# Patient Record
Sex: FEMALE | Race: WHITE | NOT HISPANIC OR LATINO
[De-identification: names, ages, dates, MRNs, and addresses within clinical notes are randomized per-mention and may not be internally consistent; named-entity substitution may affect disease eponyms.]

---

## 2018-01-31 DIAGNOSIS — Z82.61 FAMILY HISTORY OF ARTHRITIS: ICD-10-CM

## 2018-01-31 RX ORDER — CHROMIUM 200 MCG
1000 TABLET ORAL DAILY
Qty: 90 | Refills: 3 | Status: ACTIVE | COMMUNITY
Start: 2018-01-31

## 2018-02-21 ENCOUNTER — APPOINTMENT (OUTPATIENT)
Dept: INTERNAL MEDICINE | Facility: CLINIC | Age: 63
End: 2018-02-21
Payer: COMMERCIAL

## 2018-02-21 ENCOUNTER — NON-APPOINTMENT (OUTPATIENT)
Age: 63
End: 2018-02-21

## 2018-02-21 VITALS — HEIGHT: 64 IN | SYSTOLIC BLOOD PRESSURE: 126 MMHG | TEMPERATURE: 98.2 F | DIASTOLIC BLOOD PRESSURE: 88 MMHG

## 2018-02-21 VITALS — DIASTOLIC BLOOD PRESSURE: 82 MMHG | SYSTOLIC BLOOD PRESSURE: 130 MMHG

## 2018-02-21 DIAGNOSIS — Z82.3 FAMILY HISTORY OF STROKE: ICD-10-CM

## 2018-02-21 DIAGNOSIS — L25.9 UNSPECIFIED CONTACT DERMATITIS, UNSPECIFIED CAUSE: ICD-10-CM

## 2018-02-21 DIAGNOSIS — Z87.19 PERSONAL HISTORY OF OTHER DISEASES OF THE DIGESTIVE SYSTEM: ICD-10-CM

## 2018-02-21 DIAGNOSIS — Z80.2 FAMILY HISTORY OF MALIGNANT NEOPLASM OF OTHER RESPIRATORY AND INTRATHORACIC ORGANS: ICD-10-CM

## 2018-02-21 LAB
BILIRUB UR QL STRIP: NEGATIVE
CLARITY UR: CLEAR
COLLECTION METHOD: NORMAL
GLUCOSE UR-MCNC: NEGATIVE
HCG UR QL: 0.2 EU/DL
HGB UR QL STRIP.AUTO: NEGATIVE
KETONES UR-MCNC: NEGATIVE
LEUKOCYTE ESTERASE UR QL STRIP: NEGATIVE
NITRITE UR QL STRIP: NEGATIVE
PH UR STRIP: 5
PROT UR STRIP-MCNC: NEGATIVE
SP GR UR STRIP: 1.02

## 2018-02-21 PROCEDURE — 82270 OCCULT BLOOD FECES: CPT

## 2018-02-21 PROCEDURE — 99396 PREV VISIT EST AGE 40-64: CPT | Mod: 25

## 2018-02-21 PROCEDURE — 81003 URINALYSIS AUTO W/O SCOPE: CPT | Mod: QW

## 2018-02-21 PROCEDURE — 93000 ELECTROCARDIOGRAM COMPLETE: CPT

## 2018-02-21 RX ORDER — ESOMEPRAZOLE MAGNESIUM 40 MG/1
40 CAPSULE, DELAYED RELEASE ORAL
Refills: 0 | Status: DISCONTINUED | COMMUNITY
Start: 2018-01-31 | End: 2018-02-21

## 2018-02-21 RX ORDER — TRAMADOL HYDROCHLORIDE 100 MG/1
100 CAPSULE ORAL TWICE DAILY
Refills: 0 | Status: ACTIVE | COMMUNITY
Start: 2018-02-21

## 2018-02-21 RX ORDER — TRAMADOL HYDROCHLORIDE 50 MG/1
50 TABLET, COATED ORAL
Refills: 0 | Status: DISCONTINUED | COMMUNITY
Start: 2018-01-31 | End: 2018-02-21

## 2018-02-21 RX ORDER — BIOTIN 1000 MCG
1000 TABLET,CHEWABLE ORAL DAILY
Refills: 0 | Status: ACTIVE | COMMUNITY
Start: 2018-02-21

## 2018-02-21 RX ORDER — INDOMETHACIN 50 MG/1
50 CAPSULE ORAL
Qty: 30 | Refills: 0 | Status: DISCONTINUED | COMMUNITY
Start: 2018-01-31 | End: 2018-02-21

## 2018-02-21 RX ORDER — FEXOFENADINE HYDROCHLORIDE 180 MG/1
180 TABLET, FILM COATED ORAL
Refills: 0 | Status: ACTIVE | COMMUNITY
Start: 2018-02-21

## 2019-04-17 ENCOUNTER — NON-APPOINTMENT (OUTPATIENT)
Age: 64
End: 2019-04-17

## 2019-04-17 ENCOUNTER — APPOINTMENT (OUTPATIENT)
Dept: INTERNAL MEDICINE | Facility: CLINIC | Age: 64
End: 2019-04-17
Payer: COMMERCIAL

## 2019-04-17 VITALS
TEMPERATURE: 98.5 F | OXYGEN SATURATION: 96 % | SYSTOLIC BLOOD PRESSURE: 124 MMHG | DIASTOLIC BLOOD PRESSURE: 90 MMHG | HEIGHT: 64 IN | HEART RATE: 70 BPM

## 2019-04-17 VITALS — DIASTOLIC BLOOD PRESSURE: 74 MMHG | SYSTOLIC BLOOD PRESSURE: 122 MMHG

## 2019-04-17 DIAGNOSIS — M19.90 UNSPECIFIED OSTEOARTHRITIS, UNSPECIFIED SITE: ICD-10-CM

## 2019-04-17 DIAGNOSIS — R60.0 LOCALIZED EDEMA: ICD-10-CM

## 2019-04-17 PROCEDURE — 93000 ELECTROCARDIOGRAM COMPLETE: CPT

## 2019-04-17 PROCEDURE — 82270 OCCULT BLOOD FECES: CPT

## 2019-04-17 PROCEDURE — 99396 PREV VISIT EST AGE 40-64: CPT | Mod: 25

## 2019-04-17 RX ORDER — ACETAMINOPHEN 325 MG/1
325 TABLET, FILM COATED ORAL EVERY 6 HOURS
Refills: 0 | Status: COMPLETED | COMMUNITY
Start: 2018-02-21 | End: 2019-04-17

## 2019-04-17 NOTE — PAST MEDICAL HISTORY
[Menarche Age ____] : age at menarche was [unfilled] [Postmenopausal] : postmenopausal [Menopause Age____] : age at menopause was [unfilled] [Total Preg ___] : G[unfilled] [Live Births ___] : P[unfilled]  [AB Spont ___] : miscarriages: [unfilled]

## 2019-04-17 NOTE — DATA REVIEWED
[FreeTextEntry1] : Derm - never\par \par EKG - SB, R - 59, axis - (-)30, LVH, PRWP, no interval change. \par \par Labs 4/12/2019 reviewed -\par * Glucose - 78, HbA1c - 5.7,\par * chol - 175, HDL - 43, LDL - 113, TG - 95,\par * RF - 19(H), ESR - 19(N), CRP - 0.5(N),\par * the rest of labs were unremarkable.

## 2019-04-17 NOTE — PHYSICAL EXAM
[No Acute Distress] : no acute distress [Well Nourished] : well nourished [Well Developed] : well developed [Well-Appearing] : well-appearing [Normal Sclera/Conjunctiva] : normal sclera/conjunctiva [EOMI] : extraocular movements intact [PERRL] : pupils equal round and reactive to light [Normal Oropharynx] : the oropharynx was normal [Normal TMs] : both tympanic membranes were normal [Normal Nasal Mucosa] : the nasal mucosa was normal [Supple] : supple [No JVD] : no jugular venous distention [No Lymphadenopathy] : no lymphadenopathy [No Respiratory Distress] : no respiratory distress  [Clear to Auscultation] : lungs were clear to auscultation bilaterally [Regular Rhythm] : with a regular rhythm [Normal Rate] : normal rate  [Normal S1, S2] : normal S1 and S2 [No Carotid Bruits] : no carotid bruits [Pedal Pulses Present] : the pedal pulses are present [No Extremity Clubbing/Cyanosis] : no extremity clubbing/cyanosis [No Edema] : there was no peripheral edema [Normal Appearance] : normal in appearance [No Masses] : no palpable masses [No Nipple Discharge] : no nipple discharge [No Axillary Lymphadenopathy] : no axillary lymphadenopathy [Soft] : abdomen soft [Non Tender] : non-tender [Non-distended] : non-distended [Normal Bowel Sounds] : normal bowel sounds [Normal Sphincter Tone] : normal sphincter tone [No Mass] : no mass [Stool Occult Blood] : stool negative for occult blood [Normal Supraclavicular Nodes] : no supraclavicular lymphadenopathy [Normal Axillary Nodes] : no axillary lymphadenopathy [Normal Posterior Cervical Nodes] : no posterior cervical lymphadenopathy [Normal Anterior Cervical Nodes] : no anterior cervical lymphadenopathy [No Spinal Tenderness] : no spinal tenderness [No CVA Tenderness] : no CVA  tenderness [No Joint Swelling] : no joint swelling [Grossly Normal Strength/Tone] : grossly normal strength/tone [No Rash] : no rash [Normal Gait] : normal gait [No Focal Deficits] : no focal deficits [Coordination Grossly Intact] : coordination grossly intact [Speech Grossly Normal] : speech grossly normal [Normal Affect] : the affect was normal [Alert and Oriented x3] : oriented to person, place, and time [Normal Mood] : the mood was normal [de-identified] : Obese female in stated age,

## 2019-04-17 NOTE — HISTORY OF PRESENT ILLNESS
[de-identified] : Pt presented for PE.  Last PE was 2/2018.\par \par Pt c/o feeling very tired and sweaty after she swims.  Her glucose was in the 60's during these episodes, if she eats after she swims, then it helps.  She also has to sleep or at least lie down for 10-15 minutes after she swims.\par \par She thinks she lost about 8 pounds on [employer] in the past month.\par \par She urinates a lot in the last few months.  \par \par Pt got Flu vaccine in the falls from her  who's an MD.\par \par She is followed by rheumatologist for RA, and she was told she also has OA and possibly psoriatic arthritis as well as Sjogrens syndrome.  She is on Plaquenil and her symptoms are stable.

## 2019-04-17 NOTE — REVIEW OF SYSTEMS
[Recent Change In Weight] : ~T recent weight change [Frequency] : frequency [Joint Stiffness] : joint stiffness [Joint Pain] : joint pain [Joint Swelling] : joint swelling [Negative] : Heme/Lymph [Fatigue] : no fatigue [Fever] : no fever [Chills] : no chills [Chest Pain] : no chest pain [Palpitations] : no palpitations [Lower Ext Edema] : no lower extremity edema [Shortness Of Breath] : no shortness of breath [Wheezing] : no wheezing [Dyspnea on Exertion] : no dyspnea on exertion [Cough] : no cough [Nausea] : no nausea [Abdominal Pain] : no abdominal pain [Constipation] : no constipation [Diarrhea] : diarrhea [Vomiting] : no vomiting [Melena] : no melena [Heartburn] : no heartburn [Dysuria] : no dysuria [Incontinence] : no incontinence [Nocturia] : no nocturia [Hematuria] : no hematuria [Itching] : no itching [Back Pain] : no back pain [Skin Rash] : no skin rash [Mole Changes] : no mole changes [Headache] : no headache [Dizziness] : no dizziness [Fainting] : no fainting [Unsteady Walking] : no ataxia [Anxiety] : no anxiety [Insomnia] : no insomnia [Easy Bleeding] : no easy bleeding [Depression] : no depression [Swollen Glands] : no swollen glands [Easy Bruising] : no easy bruising [FreeTextEntry8] : As in HPI, Nocturia of 1 X per night,  [FreeTextEntry2] : As in HPI, [FreeTextEntry3] : wears OTC reading glasses, [FreeTextEntry9] : No change of her symptoms,

## 2019-04-17 NOTE — HEALTH RISK ASSESSMENT
[Good] : ~his/her~ current health as good [Very Good] : ~his/her~  mood as very good [No falls in past year] : Patient reported no falls in the past year [0] : 2) Feeling down, depressed, or hopeless: Not at all (0) [No Retinopathy] : No retinopathy [HIV Test offered] : HIV Test offered [Hepatitis C test offered] : Hepatitis C test offered [None] : None [With Family] : lives with family [# of Members in Household ___] :  household currently consist of [unfilled] member(s) [Employed] : employed [Graduate School] : graduate school [] :  [# Of Children ___] : has [unfilled] children [Feels Safe at Home] : Feels safe at home [Fully functional (bathing, dressing, toileting, transferring, walking, feeding)] : Fully functional (bathing, dressing, toileting, transferring, walking, feeding) [Fully functional (using the telephone, shopping, preparing meals, housekeeping, doing laundry, using] : Fully functional and needs no help or supervision to perform IADLs (using the telephone, shopping, preparing meals, housekeeping, doing laundry, using transportation, managing medications and managing finances) [Smoke Detector] : smoke detector [Carbon Monoxide Detector] : carbon monoxide detector [Seat Belt] :  uses seat belt [With Patient/Caregiver] : With Patient/Caregiver [Designated Healthcare Proxy] : Designated healthcare proxy [Relationship: ___] : Relationship: [unfilled] [de-identified] : swims 6 days per week, also very active through the days. [EyeExamDate] : 4/2019 [] : No [Change in mental status noted] : No change in mental status noted [Reports changes in hearing] : Reports no changes in hearing [Reports changes in dental health] : Reports no changes in dental health [Reports changes in vision] : Reports no changes in vision [PapSmearDate] : 3/2018 [MammogramDate] : 3/2018 [ColonoscopyDate] : 3/2017 [BoneDensityDate] : >5 years [ColonoscopyComments] : EGD - 3/2017 [de-identified] : dentist - 8/2018 [AdvancecareDate] : 4/2019

## 2019-04-17 NOTE — ASSESSMENT
[FreeTextEntry1] : Pt is UTD with colonoscopy.  She is due for repeat Pap smear, mammogram and DEXA scan.  She will f/u with GYN for them.  She was reminded to have routine eye exam, dental care and skin exam with dermatologist.

## 2020-07-08 ENCOUNTER — APPOINTMENT (OUTPATIENT)
Dept: INTERNAL MEDICINE | Facility: CLINIC | Age: 65
End: 2020-07-08
Payer: COMMERCIAL

## 2020-07-08 VITALS — DIASTOLIC BLOOD PRESSURE: 86 MMHG | SYSTOLIC BLOOD PRESSURE: 120 MMHG

## 2020-07-08 VITALS
DIASTOLIC BLOOD PRESSURE: 80 MMHG | OXYGEN SATURATION: 97 % | HEIGHT: 64 IN | HEART RATE: 69 BPM | SYSTOLIC BLOOD PRESSURE: 130 MMHG | TEMPERATURE: 98.2 F

## 2020-07-08 DIAGNOSIS — U07.1 COVID-19: ICD-10-CM

## 2020-07-08 PROCEDURE — 99213 OFFICE O/P EST LOW 20 MIN: CPT

## 2020-07-08 NOTE — ASSESSMENT
[FreeTextEntry1] : Patient is late for her physical exam.  I gave her prescription to have routine labs done and to have her schedule appointment for physical exam.

## 2020-07-08 NOTE — PHYSICAL EXAM
[No Acute Distress] : no acute distress [Well Nourished] : well nourished [No JVD] : no jugular venous distention [Well Developed] : well developed [Supple] : supple [No Respiratory Distress] : no respiratory distress  [Clear to Auscultation] : lungs were clear to auscultation bilaterally [Normal Rate] : normal rate  [Regular Rhythm] : with a regular rhythm [Normal S1, S2] : normal S1 and S2 [Normal Appearance] : normal in appearance [No Edema] : there was no peripheral edema [No Nipple Discharge] : no nipple discharge [No Axillary Lymphadenopathy] : no axillary lymphadenopathy [Soft] : abdomen soft [Non Tender] : non-tender [Normal Bowel Sounds] : normal bowel sounds [Normal Supraclavicular Nodes] : no supraclavicular lymphadenopathy [Normal Axillary Nodes] : no axillary lymphadenopathy [Normal Posterior Cervical Nodes] : no posterior cervical lymphadenopathy [Normal Anterior Cervical Nodes] : no anterior cervical lymphadenopathy [No Joint Swelling] : no joint swelling [Speech Grossly Normal] : speech grossly normal [Normal Affect] : the affect was normal [Alert and Oriented x3] : oriented to person, place, and time [Normal Mood] : the mood was normal [de-identified] : Obese female in stated age,  [de-identified] : There was a small (marble size) nontender nodule on the R breast at 4 o'clock,

## 2020-07-08 NOTE — HISTORY OF PRESENT ILLNESS
[FreeTextEntry8] : Pt presented with lump at the base of her R breast for a few weeks, it's a little sore, but no change.  She also noted a lump on the L elbow for about 2 weeks that's not painful.  Last mammogram in 3/2020 was unremarkable.\par \par Pt had COVID infection in 3/2020.  Her illness was mild and recovered after a few days.  Antibody test done in 5/2020 was positive.

## 2020-11-13 ENCOUNTER — APPOINTMENT (OUTPATIENT)
Dept: GASTROENTEROLOGY | Facility: CLINIC | Age: 65
End: 2020-11-13
Payer: COMMERCIAL

## 2020-11-13 VITALS
HEIGHT: 64 IN | OXYGEN SATURATION: 98 % | TEMPERATURE: 96.9 F | DIASTOLIC BLOOD PRESSURE: 80 MMHG | HEART RATE: 74 BPM | SYSTOLIC BLOOD PRESSURE: 130 MMHG | BODY MASS INDEX: 39.27 KG/M2 | WEIGHT: 230 LBS

## 2020-11-13 DIAGNOSIS — L40.9 PSORIASIS, UNSPECIFIED: ICD-10-CM

## 2020-11-13 PROCEDURE — 99072 ADDL SUPL MATRL&STAF TM PHE: CPT

## 2020-11-13 PROCEDURE — 99203 OFFICE O/P NEW LOW 30 MIN: CPT

## 2020-11-13 RX ORDER — IBUPROFEN 200 MG/1
200 TABLET ORAL
Refills: 0 | Status: DISCONTINUED | COMMUNITY
Start: 2018-02-21 | End: 2020-11-13

## 2020-11-15 NOTE — HISTORY OF PRESENT ILLNESS
[FreeTextEntry1] : The patient is a 65-year-old woman with a history of adenomatous colonic polyps and a strong family history of colon cancer in both her father and brother.  She also has reflux and has been having intermittent heartburn.  She was suffering from odynophagia but this seems to have resolved.  She denies abdominal pain.  She has 1 solid bowel movement a day without melena or bright red blood per rectum.  The patient's weight is stable.  The patient has rheumatoid arthritis and has been taking both indomethacin and Aleve.  She had COVID-19 in the spring with mild symptoms which have resolved.  The patient has not been admitted to the hospital in the past year and denies any cardiac issues.

## 2020-11-15 NOTE — CONSULT LETTER
[FreeTextEntry1] : Dear Dr. Marcella Burns,\[facility] \[facility] I had the pleasure of seeing your patient FREDIS MURDOCK in the office today.  My office note is attached. PLEASE READ THE "ASSESSMENT" SECTION OF THE NOTE TO SEE MY IMPRESSION AND PLAN.\par \[facility] Thank you very much for allowing me to participate in the care of your patient.\[facility] \[facility] Sincerely,\[facility] \[facility] Maximus Schmidt M.D., FAC, [facility] Director, Celiac Program at [facility]  of Medicine\[facility] and [employer] [facility] Practice Director,[facility] - Gastroenterology/Internal Medicine at [place]\[facility] [address]\[place], [place] [address] Tel: [phone]\[facility] Email: [email or web address] \[facility] \[facility] The attached note has been created using a voice recognition system (Dragon).  There may be some misspellings and typos.  Please call my office if you have any issues or questions.

## 2020-11-15 NOTE — REASON FOR VISIT
[FreeTextEntry1] : History of adenomatous colonic polyps, strong family history of colon cancer, reflux

## 2020-11-15 NOTE — ASSESSMENT
[FreeTextEntry1] : Patient with a history of adenomatous polyps and a strong family history of colon cancer in her father and brother.  She has intermittent heartburn.\par \par An EGD and colonoscopy have been scheduled. The risks, benefits, alternatives, and limitations of the procedures, including the possibility of missed lesions, were explained.

## 2020-12-14 ENCOUNTER — APPOINTMENT (OUTPATIENT)
Dept: GASTROENTEROLOGY | Facility: AMBULATORY MEDICAL SERVICES | Age: 65
End: 2020-12-14
Payer: COMMERCIAL

## 2020-12-14 PROCEDURE — G0105: CPT

## 2020-12-14 PROCEDURE — 43239 EGD BIOPSY SINGLE/MULTIPLE: CPT

## 2021-01-08 ENCOUNTER — NON-APPOINTMENT (OUTPATIENT)
Age: 66
End: 2021-01-08

## 2021-01-11 ENCOUNTER — NON-APPOINTMENT (OUTPATIENT)
Age: 66
End: 2021-01-11

## 2021-02-08 ENCOUNTER — EMERGENCY (EMERGENCY)
Facility: HOSPITAL | Age: 66
LOS: 1 days | Discharge: HOME | End: 2021-02-08
Attending: EMERGENCY MEDICINE
Payer: COMMERCIAL

## 2021-02-08 VITALS
HEART RATE: 64 BPM | TEMPERATURE: 98 F | OXYGEN SATURATION: 99 % | WEIGHT: 179.9 LBS | HEIGHT: 63 IN | RESPIRATION RATE: 18 BRPM | DIASTOLIC BLOOD PRESSURE: 77 MMHG | SYSTOLIC BLOOD PRESSURE: 155 MMHG

## 2021-02-08 LAB
ALBUMIN SERPL ELPH-MCNC: 4 G/DL — SIGNIFICANT CHANGE UP (ref 3.3–5)
ALP SERPL-CCNC: 86 U/L — SIGNIFICANT CHANGE UP (ref 40–120)
ALT FLD-CCNC: 19 U/L — SIGNIFICANT CHANGE UP (ref 10–45)
ANION GAP SERPL CALC-SCNC: 13 MMOL/L — SIGNIFICANT CHANGE UP (ref 5–17)
AST SERPL-CCNC: 24 U/L — SIGNIFICANT CHANGE UP (ref 10–40)
BASOPHILS # BLD AUTO: 0.01 K/UL — SIGNIFICANT CHANGE UP (ref 0–0.2)
BASOPHILS NFR BLD AUTO: 0.1 % — SIGNIFICANT CHANGE UP (ref 0–2)
BILIRUB SERPL-MCNC: 0.2 MG/DL — SIGNIFICANT CHANGE UP (ref 0.2–1.2)
BUN SERPL-MCNC: 27 MG/DL — HIGH (ref 7–23)
CALCIUM SERPL-MCNC: 9.3 MG/DL — SIGNIFICANT CHANGE UP (ref 8.4–10.5)
CHLORIDE SERPL-SCNC: 105 MMOL/L — SIGNIFICANT CHANGE UP (ref 96–108)
CO2 SERPL-SCNC: 24 MMOL/L — SIGNIFICANT CHANGE UP (ref 22–31)
CREAT SERPL-MCNC: 0.76 MG/DL — SIGNIFICANT CHANGE UP (ref 0.5–1.3)
EOSINOPHIL # BLD AUTO: 0.21 K/UL — SIGNIFICANT CHANGE UP (ref 0–0.5)
EOSINOPHIL NFR BLD AUTO: 2.7 % — SIGNIFICANT CHANGE UP (ref 0–6)
GLUCOSE SERPL-MCNC: 89 MG/DL — SIGNIFICANT CHANGE UP (ref 70–99)
HCT VFR BLD CALC: 43.3 % — SIGNIFICANT CHANGE UP (ref 34.5–45)
HGB BLD-MCNC: 13.4 G/DL — SIGNIFICANT CHANGE UP (ref 11.5–15.5)
IMM GRANULOCYTES NFR BLD AUTO: 0.4 % — SIGNIFICANT CHANGE UP (ref 0–1.5)
LYMPHOCYTES # BLD AUTO: 1.53 K/UL — SIGNIFICANT CHANGE UP (ref 1–3.3)
LYMPHOCYTES # BLD AUTO: 20 % — SIGNIFICANT CHANGE UP (ref 13–44)
MCHC RBC-ENTMCNC: 28.8 PG — SIGNIFICANT CHANGE UP (ref 27–34)
MCHC RBC-ENTMCNC: 30.9 GM/DL — LOW (ref 32–36)
MCV RBC AUTO: 92.9 FL — SIGNIFICANT CHANGE UP (ref 80–100)
MONOCYTES # BLD AUTO: 0.66 K/UL — SIGNIFICANT CHANGE UP (ref 0–0.9)
MONOCYTES NFR BLD AUTO: 8.6 % — SIGNIFICANT CHANGE UP (ref 2–14)
NEUTROPHILS # BLD AUTO: 5.21 K/UL — SIGNIFICANT CHANGE UP (ref 1.8–7.4)
NEUTROPHILS NFR BLD AUTO: 68.2 % — SIGNIFICANT CHANGE UP (ref 43–77)
NRBC # BLD: 0 /100 WBCS — SIGNIFICANT CHANGE UP (ref 0–0)
PLATELET # BLD AUTO: 187 K/UL — SIGNIFICANT CHANGE UP (ref 150–400)
POTASSIUM SERPL-MCNC: 4.1 MMOL/L — SIGNIFICANT CHANGE UP (ref 3.5–5.3)
POTASSIUM SERPL-SCNC: 4.1 MMOL/L — SIGNIFICANT CHANGE UP (ref 3.5–5.3)
PROT SERPL-MCNC: 6.9 G/DL — SIGNIFICANT CHANGE UP (ref 6–8.3)
RBC # BLD: 4.66 M/UL — SIGNIFICANT CHANGE UP (ref 3.8–5.2)
RBC # FLD: 12.8 % — SIGNIFICANT CHANGE UP (ref 10.3–14.5)
SODIUM SERPL-SCNC: 142 MMOL/L — SIGNIFICANT CHANGE UP (ref 135–145)
WBC # BLD: 7.65 K/UL — SIGNIFICANT CHANGE UP (ref 3.8–10.5)
WBC # FLD AUTO: 7.65 K/UL — SIGNIFICANT CHANGE UP (ref 3.8–10.5)

## 2021-02-08 PROCEDURE — 73502 X-RAY EXAM HIP UNI 2-3 VIEWS: CPT | Mod: 26,LT

## 2021-02-08 PROCEDURE — 72170 X-RAY EXAM OF PELVIS: CPT

## 2021-02-08 PROCEDURE — 99284 EMERGENCY DEPT VISIT MOD MDM: CPT

## 2021-02-08 PROCEDURE — 85025 COMPLETE CBC W/AUTO DIFF WBC: CPT

## 2021-02-08 PROCEDURE — 80053 COMPREHEN METABOLIC PANEL: CPT

## 2021-02-08 PROCEDURE — 73502 X-RAY EXAM HIP UNI 2-3 VIEWS: CPT

## 2021-02-08 PROCEDURE — 73552 X-RAY EXAM OF FEMUR 2/>: CPT

## 2021-02-08 PROCEDURE — 73552 X-RAY EXAM OF FEMUR 2/>: CPT | Mod: 26,LT

## 2021-02-08 PROCEDURE — 71045 X-RAY EXAM CHEST 1 VIEW: CPT

## 2021-02-08 PROCEDURE — 73562 X-RAY EXAM OF KNEE 3: CPT | Mod: 26,LT

## 2021-02-08 PROCEDURE — 71045 X-RAY EXAM CHEST 1 VIEW: CPT | Mod: 26

## 2021-02-08 PROCEDURE — 73562 X-RAY EXAM OF KNEE 3: CPT

## 2021-02-08 PROCEDURE — 99284 EMERGENCY DEPT VISIT MOD MDM: CPT | Mod: 25

## 2021-02-08 RX ORDER — ACETAMINOPHEN 500 MG
975 TABLET ORAL ONCE
Refills: 0 | Status: COMPLETED | OUTPATIENT
Start: 2021-02-08 | End: 2021-02-08

## 2021-02-08 RX ORDER — OXYCODONE HYDROCHLORIDE 5 MG/1
5 TABLET ORAL ONCE
Refills: 0 | Status: DISCONTINUED | OUTPATIENT
Start: 2021-02-08 | End: 2021-02-08

## 2021-02-08 RX ORDER — MORPHINE SULFATE 50 MG/1
4 CAPSULE, EXTENDED RELEASE ORAL ONCE
Refills: 0 | Status: DISCONTINUED | OUTPATIENT
Start: 2021-02-08 | End: 2021-02-08

## 2021-02-08 RX ADMIN — Medication 975 MILLIGRAM(S): at 21:35

## 2021-02-08 RX ADMIN — OXYCODONE HYDROCHLORIDE 5 MILLIGRAM(S): 5 TABLET ORAL at 22:15

## 2021-02-08 NOTE — ED PROVIDER NOTE - PROGRESS NOTE DETAILS
unable to ambulate from bed, really prefers to be discharged, will add xray of the knee and will give oxycodone - Julieth Aleman PA-C

## 2021-02-08 NOTE — ED PROVIDER NOTE - OBJECTIVE STATEMENT
66 y/o female hx RA presenting to the ED s/p slip and fall on ice. reports left leg sliding back and landing on the left leg. complaints of severe left posterior thigh pain. no headstrike or LOC. able to bend knee and ankle.

## 2021-02-08 NOTE — ED PROVIDER NOTE - MUSCULOSKELETAL, MLM
Spine appears normal, no midline tenderness, pelvis stable, able to flex at hip, extensor mechanism intact, posterior thigh with severe TTP, nv intact distally

## 2021-02-08 NOTE — ED PROVIDER NOTE - CARE PLAN
Principal Discharge DX:	Contusion of left hip, initial encounter   Principal Discharge DX:	Contusion of left hip, initial encounter  Secondary Diagnosis:	Quadriceps contusion, left, initial encounter

## 2021-02-08 NOTE — ED ADULT NURSE NOTE - OBJECTIVE STATEMENT
65y female complaining of left leg pain, pt reports slip and fall on ice earlier today landing on left side, PMH RA, pt w/ + pulses, able to move extremity, no obvious deformity or extremal injury, no other complaints, denies hitting head, LOC, bed in lowest position, call bell in reach, comfort and safety provided.

## 2021-02-08 NOTE — ED PROVIDER NOTE - ATTENDING CONTRIBUTION TO CARE
------------ATTENDING NOTE------------   pt brought to ED by EMS c/o mechanical slip/fall on ice, landed on L hip, c/o swelling and moderate ache to L thigh, worse w/ active ROM, no head injury or HA/AMS, no neck/back pain, no chest pain/sob or abdominal pain, LLE nvi w/ bcr distally and soft compartments -->  - Simon Samaniego MD   ----------------------------------------------- ------------ATTENDING NOTE------------   pt brought to ED by EMS c/o mechanical slip/fall on ice, landed on L hip, c/o swelling and moderate ache to L thigh, worse w/ active ROM, no head injury or HA/AMS, no neck/back pain, no chest pain/sob or abdominal pain, LLE nvi w/ bcr distally and soft compartments --> imaging wnl, improved pain, c/w muscle strain -vs- partial tear, has +FROM, soft compartments, nvi w/ bcr distally, safely ambulatory w/ crutches, nml VS at dc, in depth dw pt about ddx, tx, [place], continued close outpt fu.  - Simon Samaniego MD   -----------------------------------------------

## 2021-02-08 NOTE — ED PROVIDER NOTE - PATIENT PORTAL LINK FT
You can access the FollowMyHealth Patient Portal offered by [facility] by registering at the following website: [email or web address]. By joining [employer]’s FollowMyHealth portal, you will also be able to view your health information using other applications (apps) compatible with our system.

## 2021-02-08 NOTE — ED PROVIDER NOTE - NSFOLLOWUPINSTRUCTIONS_ED_ALL_ED_FT
See your Orthopedic Doctor this week for follow up -- call to discuss.    Use Acetaminophen and/or Ibuprofen as directed for pain -- see medication warnings.    Use Crutches, Ace Wrap, Ice as directed.    See MUSCLE STRAIN information and return instructions given to you.    Seek immediate medical care for new/worsening symptoms/concerns.

## 2021-02-09 VITALS
HEART RATE: 85 BPM | TEMPERATURE: 98 F | RESPIRATION RATE: 18 BRPM | DIASTOLIC BLOOD PRESSURE: 84 MMHG | SYSTOLIC BLOOD PRESSURE: 134 MMHG | OXYGEN SATURATION: 98 %

## 2021-02-15 ENCOUNTER — NON-APPOINTMENT (OUTPATIENT)
Age: 66
End: 2021-02-15

## 2021-02-22 ENCOUNTER — APPOINTMENT (OUTPATIENT)
Dept: GASTROENTEROLOGY | Facility: CLINIC | Age: 66
End: 2021-02-22

## 2021-02-23 PROBLEM — M06.9 RHEUMATOID ARTHRITIS, UNSPECIFIED: Chronic | Status: ACTIVE | Noted: 2021-02-08

## 2021-04-19 ENCOUNTER — APPOINTMENT (OUTPATIENT)
Dept: GASTROENTEROLOGY | Facility: CLINIC | Age: 66
End: 2021-04-19
Payer: COMMERCIAL

## 2021-04-19 DIAGNOSIS — K31.7 POLYP OF STOMACH AND DUODENUM: ICD-10-CM

## 2021-04-19 DIAGNOSIS — K21.9 GASTRO-ESOPHAGEAL REFLUX DISEASE W/OUT ESOPHAGITIS: ICD-10-CM

## 2021-04-19 PROCEDURE — 99213 OFFICE O/P EST LOW 20 MIN: CPT | Mod: 95

## 2021-04-19 RX ORDER — SODIUM SULFATE, POTASSIUM SULFATE, MAGNESIUM SULFATE 17.5; 3.13; 1.6 G/ML; G/ML; G/ML
17.5-3.13-1.6 SOLUTION, CONCENTRATE ORAL
Qty: 1 | Refills: 0 | Status: DISCONTINUED | COMMUNITY
Start: 2020-11-13 | End: 2021-04-19

## 2021-04-19 NOTE — HISTORY OF PRESENT ILLNESS
[Home] : at home, [unfilled] , at the time of the visit. [Medical Office: ([facility])___] : at the medical office located in  [Verbal consent obtained from patient] : the patient, [unfilled] [FreeTextEntry1] : We reviewed the patient's EGD and colonoscopy performed on December 14, 2020.  EGD was significant for a 2 cm hiatal hernia with benign fundic gland polyps in the gastric body.  Biopsies showed evidence of reflux esophagitis.  Colonoscopy was fair to poorly prepped which limited evaluation.  The exam was grossly normal other than internal and external hemorrhoids which are asymptomatic.  The patient gets pain in the chest while eating with spicy foods and sauces.  She has been taking over-the-counter Pepcid once a day with relief.  She has also adjusted her diet.\par \par \par Note from 11/13/2020 - The patient is a 65-year-old woman with a history of adenomatous colonic polyps and a strong family history of colon cancer in both her father and brother.  She also has reflux and has been having intermittent heartburn.  She was suffering from odynophagia but this seems to have resolved.  She denies abdominal pain.  She has 1 solid bowel movement a day without melena or bright red blood per rectum.  The patient's weight is stable.  The patient has rheumatoid arthritis and has been taking both indomethacin and Aleve.  She had COVID-19 in the spring with mild symptoms which have resolved.  The patient has not been admitted to the hospital in the past year and denies any cardiac issues.

## 2021-04-19 NOTE — CONSULT LETTER
[FreeTextEntry1] : Dear Dr. Marcella Burns,\[facility] \[facility] I had the pleasure of seeing your patient FREDIS MURDOCK in the office today.  My office note is attached. PLEASE READ THE "ASSESSMENT" SECTION OF THE NOTE TO SEE MY IMPRESSION AND PLAN.\par \[facility] Thank you very much for allowing me to participate in the care of your patient.\[facility] \[facility] Sincerely,\[facility] \[facility] Maximus Schmidt M.D., FAC, [facility] Director, Celiac Program at [facility]  of Medicine\[facility] and [employer] at [facility] Practice Director,[facility] - Gastroenterology/Internal Medicine at [place]\[facility] [address]\[place], [place] [address] Tel: [phone]\[facility] Email: [email or web address] \[facility] \[facility] The attached note has been created using a voice recognition system (Dragon).  There may be some misspellings and typos.  Please call my office if you have any issues or questions.

## 2021-04-19 NOTE — ASSESSMENT
[FreeTextEntry1] : Patient with a 2 cm hiatal hernia with biopsy evidence of reflux esophagitis.  She does get intermittent reflux symptoms particularly with spicy foods and sauces.  Colonoscopy was fair to poorly prepped.\par \par We discussed dietary and lifestyle modifications in the treatment of reflux and also discussed the importance of weight loss.\par \par As the patient's symptoms are controlled with Pepcid 20 mg a day, the patient will continue this at the present time.\par \par We will plan on repeating a colonoscopy at the end of this year in December 2021, utilizing the Suazo tab prep as she has had difficulty with the other preparations.\par \par Patient will call me if her symptoms of reflux worsen.  Otherwise she will return to see me at the end of the year.\par \par \par Plan from 11/13/2020 - Patient with a history of adenomatous polyps and a strong family history of colon cancer in her father and brother.  She has intermittent heartburn.\par \par An EGD and colonoscopy have been scheduled. The risks, benefits, alternatives, and limitations of the procedures, including the possibility of missed lesions, were explained.

## 2021-04-19 NOTE — REASON FOR VISIT
[FreeTextEntry1] : Reflux esophagitis, hiatal hernia, gastric polyps, hemorrhoids, history of adenomatous colonic polyps, family history of colon cancer

## 2021-12-07 ENCOUNTER — APPOINTMENT (OUTPATIENT)
Dept: GASTROENTEROLOGY | Facility: CLINIC | Age: 66
End: 2021-12-07
Payer: COMMERCIAL

## 2021-12-07 VITALS
WEIGHT: 224 LBS | BODY MASS INDEX: 38.24 KG/M2 | DIASTOLIC BLOOD PRESSURE: 80 MMHG | HEIGHT: 64 IN | SYSTOLIC BLOOD PRESSURE: 128 MMHG | TEMPERATURE: 97.1 F

## 2021-12-07 DIAGNOSIS — Z80.0 FAMILY HISTORY OF MALIGNANT NEOPLASM OF DIGESTIVE ORGANS: ICD-10-CM

## 2021-12-07 PROCEDURE — 99213 OFFICE O/P EST LOW 20 MIN: CPT

## 2021-12-08 NOTE — CONSULT LETTER
[FreeTextEntry1] : Dear Dr. Marcella Burns,\[facility] I had the pleasure of seeing your patient FREDIS MURDOCK in the office today.  My office note is attached. PLEASE READ THE "ASSESSMENT" SECTION OF THE NOTE TO SEE MY IMPRESSION AND PLAN.\par \[facility] Thank you very much for allowing me to participate in the care of your patient.\[facility] \[facility] Sincerely,\[facility] Maximus Schmidt M.D., FAC, [facility] Director, [facility]ac Program at [facility]  of Medicine\[facility] and [employer] at [facility] Practice Director,[facility] - Gastroenterology/Internal Medicine at [place]\[facility] [address]\[place], [place] [address] Tel: [phone]\[facility] Email: [email or web address] \[facility] \[facility] The attached note has been created using a voice recognition system (Dragon).  There may be some misspellings and typos.  Please call my office if you have any issues or questions.

## 2021-12-08 NOTE — HISTORY OF PRESENT ILLNESS
[FreeTextEntry1] : The patient has a history of adenomatous colonic polyps as well as a family history of colon cancer in both her father and brother.  Her last colonoscopy was fair to poorly prepped performed 1 year ago.  She also has a history of reflux esophagitis and a 2 cm hiatal hernia.  She takes Pepcid 20 mg 1-2 times a day and denies heartburn or dysphagia.  Her gas symptoms have improved, using occasional simethicone.  She has 1 solid bowel movement a day without melena or bright red blood per rectum.  She has lost 6 pounds over the past year intentionally.  The patient has not been admitted to the hospital in the past year and denies any cardiac issues.\par \par \par Note from 4/19/2021 - We reviewed the patient's EGD and colonoscopy performed on December 14, 2020.  EGD was significant for a 2 cm hiatal hernia with benign fundic gland polyps in the gastric body.  Biopsies showed evidence of reflux esophagitis.  Colonoscopy was fair to poorly prepped which limited evaluation.  The exam was grossly normal other than internal and external hemorrhoids which are asymptomatic.  The patient gets pain in the chest while eating with spicy foods and sauces.  She has been taking over-the-counter Pepcid once a day with relief.  She has also adjusted her diet.\par \par \par Note from 11/13/2020 - The patient is a 65-year-old woman with a history of adenomatous colonic polyps and a strong family history of colon cancer in both her father and brother.  She also has reflux and has been having intermittent heartburn.  She was suffering from odynophagia but this seems to have resolved.  She denies abdominal pain.  She has 1 solid bowel movement a day without melena or bright red blood per rectum.  The patient's weight is stable.  The patient has rheumatoid arthritis and has been taking both indomethacin and Aleve.  She had COVID-19 in the spring with mild symptoms which have resolved.  The patient has not been admitted to the hospital in the past year and denies any cardiac issues.

## 2021-12-08 NOTE — REASON FOR VISIT
[FreeTextEntry1] : History of adenomatous colonic polyps, family history of colon cancer, reflux esophagitis, hiatal hernia

## 2021-12-08 NOTE — ASSESSMENT
[FreeTextEntry1] : Patient with a history of adenomatous colonic polyps and a strong family history of colon cancer in both her father and brother.  Her previous colonoscopy had a fair to poor prep.  She has a history of reflux esophagitis and a 2 cm hiatal hernia with her symptoms controlled by Pepcid 20 mg 1-2 times a day.\par \par A colonoscopy has been scheduled. The risks, benefits, alternatives, and limitations of the procedure, including the possibility of missed lesions, were explained.  The patient will use the Sutab prep, which hopefully will be easier for the patient to tolerate.\par \par Patient will continue using Pepcid 20 mg 1-2 times a day.\par \par \par Plan from 4/19/2021 - Patient with a 2 cm hiatal hernia with biopsy evidence of reflux esophagitis.  She does get intermittent reflux symptoms particularly with spicy foods and sauces.  Colonoscopy was fair to poorly prepped.\par \par We discussed dietary and lifestyle modifications in the treatment of reflux and also discussed the importance of weight loss.\par \par As the patient's symptoms are controlled with Pepcid 20 mg a day, the patient will continue this at the present time.\par \par We will plan on repeating a colonoscopy at the end of this year in December 2021, utilizing the Suazo tab prep as she has had difficulty with the other preparations.\par \par Patient will call me if her symptoms of reflux worsen.  Otherwise she will return to see me at the end of the year.\par \par \par Plan from 11/13/2020 - Patient with a history of adenomatous polyps and a strong family history of colon cancer in her father and brother.  She has intermittent heartburn.\par \par An EGD and colonoscopy have been scheduled. The risks, benefits, alternatives, and limitations of the procedures, including the possibility of missed lesions, were explained.

## 2022-01-07 ENCOUNTER — APPOINTMENT (OUTPATIENT)
Dept: INTERNAL MEDICINE | Facility: CLINIC | Age: 67
End: 2022-01-07
Payer: COMMERCIAL

## 2022-01-07 ENCOUNTER — NON-APPOINTMENT (OUTPATIENT)
Age: 67
End: 2022-01-07

## 2022-01-07 VITALS — SYSTOLIC BLOOD PRESSURE: 122 MMHG | DIASTOLIC BLOOD PRESSURE: 80 MMHG

## 2022-01-07 VITALS
TEMPERATURE: 98 F | DIASTOLIC BLOOD PRESSURE: 84 MMHG | SYSTOLIC BLOOD PRESSURE: 124 MMHG | OXYGEN SATURATION: 98 % | BODY MASS INDEX: 38.24 KG/M2 | HEIGHT: 64 IN | WEIGHT: 224 LBS | HEART RATE: 70 BPM

## 2022-01-07 DIAGNOSIS — Z00.00 ENCOUNTER FOR GENERAL ADULT MEDICAL EXAMINATION W/OUT ABNORMAL FINDINGS: ICD-10-CM

## 2022-01-07 DIAGNOSIS — Z23 ENCOUNTER FOR IMMUNIZATION: ICD-10-CM

## 2022-01-07 DIAGNOSIS — Z86.39 PERSONAL HISTORY OF OTHER ENDOCRINE, NUTRITIONAL AND METABOLIC DISEASE: ICD-10-CM

## 2022-01-07 DIAGNOSIS — S76.319A STRAIN OF MUSCLE, FASCIA AND TENDON OF THE POSTERIOR MUSCLE GROUP AT THIGH LEVEL, UNSPECIFIED THIGH, INITIAL ENCOUNTER: ICD-10-CM

## 2022-01-07 DIAGNOSIS — J30.9 ALLERGIC RHINITIS, UNSPECIFIED: ICD-10-CM

## 2022-01-07 PROCEDURE — 90732 PPSV23 VACC 2 YRS+ SUBQ/IM: CPT

## 2022-01-07 PROCEDURE — G0009: CPT

## 2022-01-07 PROCEDURE — 99397 PER PM REEVAL EST PAT 65+ YR: CPT | Mod: 25

## 2022-01-07 PROCEDURE — 93000 ELECTROCARDIOGRAM COMPLETE: CPT | Mod: 59

## 2022-01-07 RX ORDER — FAMOTIDINE 20 MG/1
20 TABLET, FILM COATED ORAL DAILY
Refills: 0 | Status: ACTIVE | COMMUNITY
Start: 2022-01-07

## 2022-01-07 NOTE — DATA REVIEWED
[FreeTextEntry1] : Derm - never\par \par EKG - NSR, R - 72, axis - (-)30, LVH, PRWP, no interval change. \par \par Labs - not done yet

## 2022-01-07 NOTE — ASSESSMENT
[FreeTextEntry1] : Patient was up-to-date with all HCM tests except for DEXA scan.  She is scheduled to have repeat colonoscopy soon.  I gave her prescription to repeat DEXA scan.  She was also advised to have routine eye exam, dental care and skin exam with dermatologist.

## 2022-01-07 NOTE — PHYSICAL EXAM
[No Acute Distress] : no acute distress [Well Nourished] : well nourished [Well Developed] : well developed [Normal Sclera/Conjunctiva] : normal sclera/conjunctiva [PERRL] : pupils equal round and reactive to light [EOMI] : extraocular movements intact [Normal Outer Ear/Nose] : the outer ears and nose were normal in appearance [Normal Oropharynx] : the oropharynx was normal [Normal TMs] : both tympanic membranes were normal [Normal Nasal Mucosa] : the nasal mucosa was normal [No JVD] : no jugular venous distention [No Lymphadenopathy] : no lymphadenopathy [Supple] : supple [No Respiratory Distress] : no respiratory distress  [Clear to Auscultation] : lungs were clear to auscultation bilaterally [Normal Rate] : normal rate  [Regular Rhythm] : with a regular rhythm [Normal S1, S2] : normal S1 and S2 [No Carotid Bruits] : no carotid bruits [Pedal Pulses Present] : the pedal pulses are present [No Edema] : there was no peripheral edema [No Extremity Clubbing/Cyanosis] : no extremity clubbing/cyanosis [Normal Appearance] : normal in appearance [No Masses] : no palpable masses [No Nipple Discharge] : no nipple discharge [No Axillary Lymphadenopathy] : no axillary lymphadenopathy [Soft] : abdomen soft [Non Tender] : non-tender [Non-distended] : non-distended [Normal Bowel Sounds] : normal bowel sounds [Declined Rectal Exam] : declined rectal exam [Normal Supraclavicular Nodes] : no supraclavicular lymphadenopathy [Normal Axillary Nodes] : no axillary lymphadenopathy [Normal Posterior Cervical Nodes] : no posterior cervical lymphadenopathy [Normal Anterior Cervical Nodes] : no anterior cervical lymphadenopathy [No CVA Tenderness] : no CVA  tenderness [No Spinal Tenderness] : no spinal tenderness [No Joint Swelling] : no joint swelling [Grossly Normal Strength/Tone] : grossly normal strength/tone [No Rash] : no rash [Coordination Grossly Intact] : coordination grossly intact [No Focal Deficits] : no focal deficits [Normal Gait] : normal gait [Speech Grossly Normal] : speech grossly normal [Normal Affect] : the affect was normal [Alert and Oriented x3] : oriented to person, place, and time [Normal Mood] : the mood was normal [de-identified] : Obese female in stated age,  [FreeTextEntry1] : deferred, pt is scheduled for a repeat colonoscopy  in a few weeks,

## 2022-01-07 NOTE — HISTORY OF PRESENT ILLNESS
[FreeTextEntry1] : Pt presented for PE.  Last PE was 4/2019. [de-identified] : Her health was uneventful since last visit, she has no new complaint. \par \par Pt had COVID infection in spring of 2020, she had mild disease.\par \par She had COVID Vaccine and the booster and tolerated them well.   She had Flu vaccine.  She also had Shingle vaccine.  She would like to have pneumonia vaccine today.

## 2022-01-07 NOTE — REVIEW OF SYSTEMS
[Recent Change In Weight] : ~T recent weight change [Heartburn] : heartburn [Joint Pain] : joint pain [Joint Stiffness] : joint stiffness [Joint Swelling] : joint swelling [Negative] : Heme/Lymph [Fever] : no fever [Chills] : no chills [Fatigue] : no fatigue [Chest Pain] : no chest pain [Palpitations] : no palpitations [Lower Ext Edema] : no lower extremity edema [Shortness Of Breath] : no shortness of breath [Wheezing] : no wheezing [Cough] : no cough [Dyspnea on Exertion] : no dyspnea on exertion [Abdominal Pain] : no abdominal pain [Nausea] : no nausea [Constipation] : no constipation [Diarrhea] : diarrhea [Vomiting] : no vomiting [Melena] : no melena [Dysuria] : no dysuria [Incontinence] : no incontinence [Nocturia] : no nocturia [Hematuria] : no hematuria [Back Pain] : no back pain [Itching] : no itching [Mole Changes] : no mole changes [Skin Rash] : no skin rash [Headache] : no headache [Dizziness] : no dizziness [Fainting] : no fainting [Unsteady Walking] : no ataxia [Insomnia] : no insomnia [Anxiety] : no anxiety [Depression] : no depression [Easy Bleeding] : no easy bleeding [Easy Bruising] : no easy bruising [Swollen Glands] : no swollen glands [FreeTextEntry2] : Lost 6-8 lbs in the past year, [FreeTextEntry7] : No heartburn on Pepcid,  [FreeTextEntry8] : Nocturia of 1 X per night,  [FreeTextEntry9] : No change of her symptoms, [de-identified] : Some paresthesia on RUE, has apt to see specialist soon,

## 2022-01-07 NOTE — HEALTH RISK ASSESSMENT
[Very Good] : ~his/her~  mood as very good [Never] : Never [Yes] : Yes [Monthly or less (1 pt)] : Monthly or less (1 point) [1 or 2 (0 pts)] : 1 or 2 (0 points) [Never (0 pts)] : Never (0 points) [No] : In the past 12 months have you used drugs other than those required for medical reasons? No [No falls in past year] : Patient reported no falls in the past year [0] : 2) Feeling down, depressed, or hopeless: Not at all (0) [PHQ-2 Negative - No further assessment needed] : PHQ-2 Negative - No further assessment needed [No Retinopathy] : No retinopathy [None] : None [With Family] : lives with family [# of Members in Household ___] :  household currently consist of [unfilled] member(s) [Employed] : employed [Graduate School] : graduate school [] :  [# Of Children ___] : has [unfilled] children [Feels Safe at Home] : Feels safe at home [Fully functional (bathing, dressing, toileting, transferring, walking, feeding)] : Fully functional (bathing, dressing, toileting, transferring, walking, feeding) [Fully functional (using the telephone, shopping, preparing meals, housekeeping, doing laundry, using] : Fully functional and needs no help or supervision to perform IADLs (using the telephone, shopping, preparing meals, housekeeping, doing laundry, using transportation, managing medications and managing finances) [Smoke Detector] : smoke detector [Carbon Monoxide Detector] : carbon monoxide detector [Seat Belt] :  uses seat belt [With Patient/Caregiver] : , with patient/caregiver [Relationship: ___] : Relationship: [unfilled] [Audit-CScore] : 1 [de-identified] : swims for 40 minutes for 7 days per week, otherwise somewhat active, [ASH3Pkkee] : 0 [EyeExamDate] : 07/21 [Change in mental status noted] : No change in mental status noted [Reports changes in hearing] : Reports no changes in hearing [Reports changes in vision] : Reports no changes in vision [Reports changes in dental health] : Reports no changes in dental health [MammogramDate] : 03/21 [PapSmearDate] : 03/21 [BoneDensityDate] : >5 year [ColonoscopyDate] : 12/20 [ColonoscopyComments] : EGD - 12/2020 [de-identified] : dentist - 9/2021 [AdvancecareDate] : 01/22

## 2022-01-25 ENCOUNTER — APPOINTMENT (OUTPATIENT)
Dept: GASTROENTEROLOGY | Facility: AMBULATORY MEDICAL SERVICES | Age: 67
End: 2022-01-25

## 2022-03-28 DIAGNOSIS — Z01.818 ENCOUNTER FOR OTHER PREPROCEDURAL EXAMINATION: ICD-10-CM

## 2022-04-06 ENCOUNTER — NON-APPOINTMENT (OUTPATIENT)
Age: 67
End: 2022-04-06

## 2022-04-13 ENCOUNTER — APPOINTMENT (OUTPATIENT)
Dept: GASTROENTEROLOGY | Facility: AMBULATORY MEDICAL SERVICES | Age: 67
End: 2022-04-13
Payer: COMMERCIAL

## 2022-04-13 PROCEDURE — 45380 COLONOSCOPY AND BIOPSY: CPT | Mod: 33

## 2022-05-02 ENCOUNTER — APPOINTMENT (OUTPATIENT)
Dept: GASTROENTEROLOGY | Facility: CLINIC | Age: 67
End: 2022-05-02
Payer: COMMERCIAL

## 2022-05-02 VITALS
DIASTOLIC BLOOD PRESSURE: 80 MMHG | OXYGEN SATURATION: 98 % | HEART RATE: 86 BPM | BODY MASS INDEX: 38.59 KG/M2 | HEIGHT: 63.5 IN | SYSTOLIC BLOOD PRESSURE: 132 MMHG | WEIGHT: 220.5 LBS | TEMPERATURE: 97.3 F

## 2022-05-02 DIAGNOSIS — K64.8 OTHER HEMORRHOIDS: ICD-10-CM

## 2022-05-02 DIAGNOSIS — K64.4 RESIDUAL HEMORRHOIDAL SKIN TAGS: ICD-10-CM

## 2022-05-02 PROBLEM — Z00.00 ENCOUNTER FOR PREVENTIVE HEALTH EXAMINATION: Noted: 2022-05-02

## 2022-05-02 PROCEDURE — 99213 OFFICE O/P EST LOW 20 MIN: CPT

## 2022-05-02 NOTE — HISTORY OF PRESENT ILLNESS
[FreeTextEntry1] : We reviewed the patient's colonoscopy performed on April 13, 2022.  2 polyps were removed, one tubular adenoma and 1 hyperplastic.  The patient also has internal and external hemorrhoids which are asymptomatic.  Patient has not history of reflux esophagitis and is doing well on Pepcid 20 mg a day.  She denies heartburn, dysphagia, abdominal pain.  She gets occasional gas relieved by simethicone.  Bowel movements are normal without melena or bright red blood per rectum.\par \par \par Note from 12/7/2021 - The patient has a history of adenomatous colonic polyps as well as a family history of colon cancer in both her father and brother.  Her last colonoscopy was fair to poorly prepped performed 1 year ago.  She also has a history of reflux esophagitis and a 2 cm hiatal hernia.  She takes Pepcid 20 mg 1-2 times a day and denies heartburn or dysphagia.  Her gas symptoms have improved, using occasional simethicone.  She has 1 solid bowel movement a day without melena or bright red blood per rectum.  She has lost 6 pounds over the past year intentionally.  The patient has not been admitted to the hospital in the past year and denies any cardiac issues.\par \par \par Note from 4/19/2021 - We reviewed the patient's EGD and colonoscopy performed on December 14, 2020.  EGD was significant for a 2 cm hiatal hernia with benign fundic gland polyps in the gastric body.  Biopsies showed evidence of reflux esophagitis.  Colonoscopy was fair to poorly prepped which limited evaluation.  The exam was grossly normal other than internal and external hemorrhoids which are asymptomatic.  The patient gets pain in the chest while eating with spicy foods and sauces.  She has been taking over-the-counter Pepcid once a day with relief.  She has also adjusted her diet.\par \par \par Note from 11/13/2020 - The patient is a 65-year-old woman with a history of adenomatous colonic polyps and a strong family history of colon cancer in both her father and brother.  She also has reflux and has been having intermittent heartburn.  She was suffering from odynophagia but this seems to have resolved.  She denies abdominal pain.  She has 1 solid bowel movement a day without melena or bright red blood per rectum.  The patient's weight is stable.  The patient has rheumatoid arthritis and has been taking both indomethacin and Aleve.  She had COVID-19 in the spring with mild symptoms which have resolved.  The patient has not been admitted to the hospital in the past year and denies any cardiac issues.

## 2022-05-02 NOTE — ASSESSMENT
[FreeTextEntry1] : Patient with an adenomatous colonic polyp.  She has reflux esophagitis and is doing well on Pepcid 20 mg a day.\par \par We will repeat a colonoscopy in 3 years that is April 2025.\par \par \par Plan from 12/7/2021 - Patient with a history of adenomatous colonic polyps and a strong family history of colon cancer in both her father and brother.  Her previous colonoscopy had a fair to poor prep.  She has a history of reflux esophagitis and a 2 cm hiatal hernia with her symptoms controlled by Pepcid 20 mg 1-2 times a day.\par \par A colonoscopy has been scheduled. The risks, benefits, alternatives, and limitations of the procedure, including the possibility of missed lesions, were explained.  The patient will use the Sutab prep, which hopefully will be easier for the patient to tolerate.\par \par Patient will continue using Pepcid 20 mg 1-2 times a day.\par \par \par Plan from 4/19/2021 - Patient with a 2 cm hiatal hernia with biopsy evidence of reflux esophagitis.  She does get intermittent reflux symptoms particularly with spicy foods and sauces.  Colonoscopy was fair to poorly prepped.\par \par We discussed dietary and lifestyle modifications in the treatment of reflux and also discussed the importance of weight loss.\par \par As the patient's symptoms are controlled with Pepcid 20 mg a day, the patient will continue this at the present time.\par \par We will plan on repeating a colonoscopy at the end of this year in December 2021, utilizing the Suazo tab prep as she has had difficulty with the other preparations.\par \par Patient will call me if her symptoms of reflux worsen.  Otherwise she will return to see me at the end of the year.\par \par \par Plan from 11/13/2020 - Patient with a history of adenomatous polyps and a strong family history of colon cancer in her father and brother.  She has intermittent heartburn.\par \par An EGD and colonoscopy have been scheduled. The risks, benefits, alternatives, and limitations of the procedures, including the possibility of missed lesions, were explained.

## 2022-05-02 NOTE — CONSULT LETTER
[FreeTextEntry1] : Dear Dr. Marcella Burns,\[facility] \[facility] I had the pleasure of seeing your patient FREDIS MURDOCK in the office today.  My office note is attached. PLEASE READ THE "ASSESSMENT" SECTION OF THE NOTE TO SEE MY IMPRESSION AND PLAN.\par \[facility] Thank you very much for allowing me to participate in the care of your patient.\[facility] \[facility] Sincerely,\[facility] \[facility] Maximus Schmidt M.D., FAC, [facility] Director, Celiac Program at [facility]  of Medicine\[facility] [employer] at [facility] Practice Director,[facility] - Gastroenterology/Internal Medicine at [place]\[facility] [address]\[place], [place] [address] Tel: [phone]\[facility] Email: [email or web address] \[facility] \[facility] The attached note has been created using a voice recognition system (Dragon).  There may be some misspellings and typos.  Please call my office if you have any issues or questions.

## 2022-05-04 ENCOUNTER — APPOINTMENT (OUTPATIENT)
Dept: GASTROENTEROLOGY | Facility: CLINIC | Age: 67
End: 2022-05-04

## 2022-09-15 ENCOUNTER — APPOINTMENT (OUTPATIENT)
Dept: INTERNAL MEDICINE | Facility: CLINIC | Age: 67
End: 2022-09-15

## 2022-09-15 DIAGNOSIS — N63.0 UNSPECIFIED LUMP IN UNSPECIFIED BREAST: ICD-10-CM

## 2022-09-15 DIAGNOSIS — Z11.52 ENCOUNTER FOR SCREENING FOR COVID-19: ICD-10-CM

## 2022-09-15 PROCEDURE — 99443: CPT

## 2022-09-15 RX ORDER — INDOMETHACIN 50 MG/1
CAPSULE ORAL
Refills: 0 | Status: COMPLETED | COMMUNITY
End: 2022-09-15

## 2022-09-15 RX ORDER — SODIUM SULFATE, MAGNESIUM SULFATE, AND POTASSIUM CHLORIDE 17.75; 2.7; 2.25 G/1; G/1; G/1
1479-225-188 TABLET ORAL
Qty: 1 | Refills: 0 | Status: COMPLETED | COMMUNITY
Start: 2021-12-07 | End: 2022-09-15

## 2022-09-15 RX ORDER — FAMOTIDINE 20 MG/1
20 TABLET, FILM COATED ORAL
Refills: 0 | Status: COMPLETED | COMMUNITY
End: 2022-09-15

## 2022-09-15 RX ORDER — ANASTROZOLE TABLETS 1 MG/1
1 TABLET ORAL
Qty: 90 | Refills: 0 | Status: ACTIVE | COMMUNITY
Start: 2022-09-15

## 2022-09-15 RX ORDER — HYDROXYCHLOROQUINE SULFATE 200 MG/1
200 TABLET, FILM COATED ORAL DAILY
Refills: 0 | Status: COMPLETED | COMMUNITY
Start: 2019-04-17 | End: 2022-09-15

## 2022-09-15 NOTE — PHYSICAL EXAM
[No Acute Distress] : no acute distress [Speech Grossly Normal] : speech grossly normal [Alert and Oriented x3] : oriented to person, place, and time [Normal Mood] : the mood was normal [de-identified] : Unable to examine pt due to telephonic encounter,

## 2022-09-15 NOTE — DATA REVIEWED
[FreeTextEntry1] : Labs from 9/8/2022 reviewed - -\par * BUN/Cr - 37/1.16, the rest of CMP were  noraml, \par * Chol - 220, HDL - 51, LDL - 143, TG - 132,\par * HbA1c - 5.8, glucose - 85,

## 2022-09-15 NOTE — HISTORY OF PRESENT ILLNESS
[Home] : at home, [unfilled] , at the time of the visit. [Medical Office: ([facility])___] : at the medical office located in  [Verbal consent obtained from patient] : the patient, [unfilled] [FreeTextEntry1] : Pt presented for this telephonic encounter to f/u on HLD & glucose intolerance.  She had labs done and would like to review results. [de-identified] : Pt had lumpectomy in 5/2022, and then went on Anastrazole in 7/2022.  Her breast cancer was considered stage 1.  She will have repeat imaging studies in 11/2022.\par \par BP 57[address]-31 at home most of the time, and she is trying to eat healthier and exercise as much as she can, despite arthritis.

## 2022-11-23 ENCOUNTER — NON-APPOINTMENT (OUTPATIENT)
Age: 67
End: 2022-11-23

## 2023-01-31 ENCOUNTER — APPOINTMENT (OUTPATIENT)
Dept: INTERNAL MEDICINE | Facility: CLINIC | Age: 68
End: 2023-01-31
Payer: COMMERCIAL

## 2023-01-31 VITALS
HEART RATE: 77 BPM | TEMPERATURE: 98 F | WEIGHT: 222 LBS | OXYGEN SATURATION: 98 % | BODY MASS INDEX: 38.85 KG/M2 | DIASTOLIC BLOOD PRESSURE: 81 MMHG | SYSTOLIC BLOOD PRESSURE: 133 MMHG | HEIGHT: 63.5 IN

## 2023-01-31 VITALS — SYSTOLIC BLOOD PRESSURE: 138 MMHG | DIASTOLIC BLOOD PRESSURE: 82 MMHG

## 2023-01-31 PROCEDURE — 99214 OFFICE O/P EST MOD 30 MIN: CPT

## 2023-02-01 NOTE — HISTORY OF PRESENT ILLNESS
[FreeTextEntry1] : Pt presented for f/u of CP. [de-identified] : Pt recently went to [place], and she came home with CP under her L breast, she went to ED, and r/o'ed for PE.  \par \par She then went to see cardio and had normal stress test and echo.  She also had Xray of rib and sternum, it was negative for fracture.\par \par She then started taking Protonix in addition to Pepcid, and pain is slightly less.  She has been taking Ibuprofen 2 tabs in the morning, and Tylenol for 6 months, but stopped the Ibuprofen recently and takes Tylenol BID, and takes Tramadol for breakfast.\par \par Pain is on L under the breast, worse with movement, sneezing and movement.  She denied any F/S/C, flu like symptoms.  She is taking Tramadol for OA and RA, but only takes Tramadol 100 mg QOD instead of daily.  She is being weaned off meds.  The pain is there 24 hours, but worse by the end of the day.

## 2023-02-01 NOTE — PHYSICAL EXAM
[No Acute Distress] : no acute distress [Well Nourished] : well nourished [Well Developed] : well developed [Supple] : supple [No Respiratory Distress] : no respiratory distress  [Clear to Auscultation] : lungs were clear to auscultation bilaterally [Normal Rate] : normal rate  [Regular Rhythm] : with a regular rhythm [Normal S1, S2] : normal S1 and S2 [No Edema] : there was no peripheral edema [Soft] : abdomen soft [Non Tender] : non-tender [Normal Bowel Sounds] : normal bowel sounds [No CVA Tenderness] : no CVA  tenderness [No Spinal Tenderness] : no spinal tenderness [No Joint Swelling] : no joint swelling [Grossly Normal Strength/Tone] : grossly normal strength/tone [No Rash] : no rash [Normal Gait] : normal gait [Speech Grossly Normal] : speech grossly normal [Normal Affect] : the affect was normal [Alert and Oriented x3] : oriented to person, place, and time [Normal Mood] : the mood was normal [de-identified] : Obese female in stated age,  [de-identified] : Pt has tenderness over the L lower anterior rib cage under the L breast,

## 2023-02-07 ENCOUNTER — NON-APPOINTMENT (OUTPATIENT)
Age: 68
End: 2023-02-07

## 2023-02-09 ENCOUNTER — APPOINTMENT (OUTPATIENT)
Dept: GASTROENTEROLOGY | Facility: CLINIC | Age: 68
End: 2023-02-09
Payer: COMMERCIAL

## 2023-02-09 DIAGNOSIS — R07.89 OTHER CHEST PAIN: ICD-10-CM

## 2023-02-09 DIAGNOSIS — Z85.3 PERSONAL HISTORY OF MALIGNANT NEOPLASM OF BREAST: ICD-10-CM

## 2023-02-09 DIAGNOSIS — Z92.3 PERSONAL HISTORY OF IRRADIATION: ICD-10-CM

## 2023-02-09 PROCEDURE — 99214 OFFICE O/P EST MOD 30 MIN: CPT | Mod: 95

## 2023-02-10 NOTE — ASSESSMENT
[FreeTextEntry1] : Patient with a history of a 2 cm hiatal hernia and reflux esophagitis who has been having atypical chest pain that appears to have responded to treatment with Protonix.  She also has a history of adenomatous colonic polyps.\par \par An EGD will be scheduled. The risks, benefits, alternatives, and limitations of the procedure were explained.\par \par Patient is due for colonoscopy in April 2025.\par \par \par Plan from 5/2/2022 - Patient with an adenomatous colonic polyp.  She has reflux esophagitis and is doing well on Pepcid 20 mg a day.\par \par We will repeat a colonoscopy in 3 years that is April 2025.\par \par \par Plan from 12/7/2021 - Patient with a history of adenomatous colonic polyps and a strong family history of colon cancer in both her father and brother.  Her previous colonoscopy had a fair to poor prep.  She has a history of reflux esophagitis and a 2 cm hiatal hernia with her symptoms controlled by Pepcid 20 mg 1-2 times a day.\par \par A colonoscopy has been scheduled. The risks, benefits, alternatives, and limitations of the procedure, including the possibility of missed lesions, were explained.  The patient will use the Sutab prep, which hopefully will be easier for the patient to tolerate.\par \par Patient will continue using Pepcid 20 mg 1-2 times a day.\par \par \par Plan from 4/19/2021 - Patient with a 2 cm hiatal hernia with biopsy evidence of reflux esophagitis.  She does get intermittent reflux symptoms particularly with spicy foods and sauces.  Colonoscopy was fair to poorly prepped.\par \par We discussed dietary and lifestyle modifications in the treatment of reflux and also discussed the importance of weight loss.\par \par As the patient's symptoms are controlled with Pepcid 20 mg a day, the patient will continue this at the present time.\par \par We will plan on repeating a colonoscopy at the end of this year in December 2021, utilizing the Suazo tab prep as she has had difficulty with the other preparations.\par \par Patient will call me if her symptoms of reflux worsen.  Otherwise she will return to see me at the end of the year.\par \par \par Plan from 11/13/2020 - Patient with a history of adenomatous polyps and a strong family history of colon cancer in her father and brother.  She has intermittent heartburn.\par \par An EGD and colonoscopy have been scheduled. The risks, benefits, alternatives, and limitations of the procedures, including the possibility of missed lesions, were explained.

## 2023-02-10 NOTE — CONSULT LETTER
[FreeTextEntry1] : Dear Dr. Marcella Burns,\par \par I had the pleasure of seeing your patient SOFI MURDOCK in the office today.  My office note is attached. PLEASE READ THE "ASSESSMENT" SECTION OF THE NOTE TO SEE MY IMPRESSION AND PLAN.\par \par Thank you very much for allowing me to participate in the care of your patient.\par \par Sincerely,\par \par Maximus Schmidt M.D., FAC, FACP\par Director, Celiac Program at [facility]\par  of Medicine, [employer] at [facility] Adjunct  of Medicine, [facility] Practice Director, [facility] - Gastroenterology at [place]\[facility] [address]\[place] [address]\par Tel: [phone]\par Email: [email or web address] \par \par The attached note has been created using a voice recognition system (Dragon).  There may be some misspellings and typos.  Please call my office if you have any issues or questions.

## 2023-02-10 NOTE — HISTORY OF PRESENT ILLNESS
[Home] : at home, [unfilled] , at the time of the visit. [Medical Office: ([facility])___] : at the medical office located in  [Verbal consent obtained from patient] : the patient, [unfilled] [FreeTextEntry1] : The patient has a history of a 2 cm hiatal hernia with reflux esophagitis and a history of adenomatous colonic polyps.  She had been doing well on Pepcid 20 mg a day.  In November, the patient developed chest pain behind her left breast.  She went to the hospital where she had a negative evaluation and subsequently had a negative cardiac evaluation.  She then went to [facility] as she was diagnosed with breast cancer in May 2022 and underwent lumpectomy and radiation.  She had negative chest x-rays.  She then tried taking Protonix 20 mg twice daily along with the Pepcid 20 mg a day.  She took this for 3 weeks and then stopped it 1-1/2 weeks ago.  She noticed that the pain improved significantly on the Protonix and is now minimal.  She also notes that the pain was associated with belching.  There is no relationship to eating.  She denies heartburn, dysphagia, nausea, vomiting, abdominal pain.  She reports 1 normal bowel movement a day without melena or bright red blood per rectum.  Other than the hospitalization for lumpectomy, the patient has not been admitted to the hospital in the past year and denies any cardiac issues.\par \par \par Note from 5/2/2022 - We reviewed the patient's colonoscopy performed on April 13, 2022.  2 polyps were removed, one tubular adenoma and 1 hyperplastic.  The patient also has internal and external hemorrhoids which are asymptomatic.  Patient has not history of reflux esophagitis and is doing well on Pepcid 20 mg a day.  She denies heartburn, dysphagia, abdominal pain.  She gets occasional gas relieved by simethicone.  Bowel movements are normal without melena or bright red blood per rectum.\par \par \par Note from 12/7/2021 - The patient has a history of adenomatous colonic polyps as well as a family history of colon cancer in both her father and brother.  Her last colonoscopy was fair to poorly prepped performed 1 year ago.  She also has a history of reflux esophagitis and a 2 cm hiatal hernia.  She takes Pepcid 20 mg 1-2 times a day and denies heartburn or dysphagia.  Her gas symptoms have improved, using occasional simethicone.  She has 1 solid bowel movement a day without melena or bright red blood per rectum.  She has lost 6 pounds over the past year intentionally.  The patient has not been admitted to the hospital in the past year and denies any cardiac issues.\par \par \par Note from 4/19/2021 - We reviewed the patient's EGD and colonoscopy performed on December 14, 2020.  EGD was significant for a 2 cm hiatal hernia with benign fundic gland polyps in the gastric body.  Biopsies showed evidence of reflux esophagitis.  Colonoscopy was fair to poorly prepped which limited evaluation.  The exam was grossly normal other than internal and external hemorrhoids which are asymptomatic.  The patient gets pain in the chest while eating with spicy foods and sauces.  She has been taking over-the-counter Pepcid once a day with relief.  She has also adjusted her diet.\par \par \par Note from 11/13/2020 - The patient is a 65-year-old woman with a history of adenomatous colonic polyps and a strong family history of colon cancer in both her father and brother.  She also has reflux and has been having intermittent heartburn.  She was suffering from odynophagia but this seems to have resolved.  She denies abdominal pain.  She has 1 solid bowel movement a day without melena or bright red blood per rectum.  The patient's weight is stable.  The patient has rheumatoid arthritis and has been taking both indomethacin and Aleve.  She had COVID-19 in the spring with mild symptoms which have resolved.  The patient has not been admitted to the hospital in the past year and denies any cardiac issues.

## 2023-02-10 NOTE — REASON FOR VISIT
[FreeTextEntry1] : Reflux esophagitis, atypical chest pain, history of adenomatous colonic polyps, hiatal hernia

## 2023-03-21 ENCOUNTER — NON-APPOINTMENT (OUTPATIENT)
Age: 68
End: 2023-03-21

## 2023-03-24 ENCOUNTER — NON-APPOINTMENT (OUTPATIENT)
Age: 68
End: 2023-03-24

## 2023-03-26 ENCOUNTER — TRANSCRIPTION ENCOUNTER (OUTPATIENT)
Age: 68
End: 2023-03-26

## 2023-03-27 ENCOUNTER — NON-APPOINTMENT (OUTPATIENT)
Age: 68
End: 2023-03-27

## 2023-08-03 ENCOUNTER — RX RENEWAL (OUTPATIENT)
Age: 68
End: 2023-08-03

## 2023-08-28 ENCOUNTER — TRANSCRIPTION ENCOUNTER (OUTPATIENT)
Age: 68
End: 2023-08-28

## 2023-09-05 ENCOUNTER — APPOINTMENT (OUTPATIENT)
Dept: ORTHOPEDIC SURGERY | Facility: CLINIC | Age: 68
End: 2023-09-05
Payer: COMMERCIAL

## 2023-09-05 VITALS
BODY MASS INDEX: 35 KG/M2 | HEART RATE: 62 BPM | TEMPERATURE: 97.8 F | HEIGHT: 63.5 IN | SYSTOLIC BLOOD PRESSURE: 150 MMHG | WEIGHT: 200 LBS | DIASTOLIC BLOOD PRESSURE: 92 MMHG | OXYGEN SATURATION: 97 %

## 2023-09-05 DIAGNOSIS — M21.41 FLAT FOOT [PES PLANUS] (ACQUIRED), RIGHT FOOT: ICD-10-CM

## 2023-09-05 DIAGNOSIS — S93.129A DISLOCATION OF METATARSOPHALANGEAL JOINT OF UNSPECIFIED TOE(S), INITIAL ENCOUNTER: ICD-10-CM

## 2023-09-05 DIAGNOSIS — M06.9 UNSPECIFIED ACQUIRED DEFORMITY OF RIGHT LOWER LEG: ICD-10-CM

## 2023-09-05 DIAGNOSIS — M21.42 FLAT FOOT [PES PLANUS] (ACQUIRED), RIGHT FOOT: ICD-10-CM

## 2023-09-05 DIAGNOSIS — S93.129S: ICD-10-CM

## 2023-09-05 DIAGNOSIS — M21.961 UNSPECIFIED ACQUIRED DEFORMITY OF RIGHT LOWER LEG: ICD-10-CM

## 2023-09-05 DIAGNOSIS — M25.774 OSTEOPHYTE, RIGHT FOOT: ICD-10-CM

## 2023-09-05 DIAGNOSIS — M94.279 CHONDROMALACIA, UNSPECIFIED ANKLE AND JOINTS OF FOOT: ICD-10-CM

## 2023-09-05 PROCEDURE — 99203 OFFICE O/P NEW LOW 30 MIN: CPT

## 2023-09-05 PROCEDURE — 73630 X-RAY EXAM OF FOOT: CPT | Mod: RT

## 2023-09-17 PROBLEM — S93.129S: Status: ACTIVE | Noted: 2023-09-17

## 2023-09-17 PROBLEM — M94.279 CHONDROMALACIA OF ANKLE OR JOINT OF FOOT: Status: ACTIVE | Noted: 2023-09-17

## 2023-09-17 PROBLEM — M25.774 METATARSAL BOSS OF RIGHT FOOT: Status: ACTIVE | Noted: 2023-09-17

## 2023-09-17 PROBLEM — S93.129A: Status: ACTIVE | Noted: 2023-09-17

## 2023-09-17 PROBLEM — M21.961: Status: ACTIVE | Noted: 2023-09-17

## 2023-09-17 PROBLEM — M21.41 ACQUIRED PES PLANUS OF BOTH FEET: Status: ACTIVE | Noted: 2023-09-17

## 2023-10-01 PROBLEM — Z92.3 HISTORY OF RADIATION THERAPY: Status: RESOLVED | Noted: 2023-02-09 | Resolved: 2023-10-01

## 2023-11-27 ENCOUNTER — APPOINTMENT (OUTPATIENT)
Dept: GASTROENTEROLOGY | Facility: AMBULATORY MEDICAL SERVICES | Age: 68
End: 2023-11-27
Payer: COMMERCIAL

## 2023-11-27 PROCEDURE — 43239 EGD BIOPSY SINGLE/MULTIPLE: CPT

## 2023-11-27 RX ORDER — PANTOPRAZOLE 20 MG/1
20 TABLET, DELAYED RELEASE ORAL TWICE DAILY
Qty: 180 | Refills: 1 | Status: ACTIVE | COMMUNITY
Start: 2023-11-27 | End: 1900-01-01

## 2023-12-06 ENCOUNTER — NON-APPOINTMENT (OUTPATIENT)
Age: 68
End: 2023-12-06

## 2023-12-13 ENCOUNTER — NON-APPOINTMENT (OUTPATIENT)
Age: 68
End: 2023-12-13

## 2024-01-03 ENCOUNTER — APPOINTMENT (OUTPATIENT)
Dept: GASTROENTEROLOGY | Facility: CLINIC | Age: 69
End: 2024-01-03
Payer: COMMERCIAL

## 2024-01-03 VITALS
DIASTOLIC BLOOD PRESSURE: 86 MMHG | SYSTOLIC BLOOD PRESSURE: 130 MMHG | OXYGEN SATURATION: 96 % | HEART RATE: 63 BPM | TEMPERATURE: 95.7 F | HEIGHT: 64 IN | BODY MASS INDEX: 35.85 KG/M2 | WEIGHT: 210 LBS

## 2024-01-03 DIAGNOSIS — K44.9 DIAPHRAGMATIC HERNIA W/OUT OBSTRUCTION OR GANGRENE: ICD-10-CM

## 2024-01-03 DIAGNOSIS — D12.6 BENIGN NEOPLASM OF COLON, UNSPECIFIED: ICD-10-CM

## 2024-01-03 DIAGNOSIS — R68.81 EARLY SATIETY: ICD-10-CM

## 2024-01-03 DIAGNOSIS — R63.4 ABNORMAL WEIGHT LOSS: ICD-10-CM

## 2024-01-03 DIAGNOSIS — R14.2 ERUCTATION: ICD-10-CM

## 2024-01-03 DIAGNOSIS — R10.9 UNSPECIFIED ABDOMINAL PAIN: ICD-10-CM

## 2024-01-03 DIAGNOSIS — K29.60 OTHER GASTRITIS W/OUT BLEEDING: ICD-10-CM

## 2024-01-03 DIAGNOSIS — K31.A0 GASTRIC INTESTINAL METAPLASIA, UNSPECIFIED: ICD-10-CM

## 2024-01-03 DIAGNOSIS — Z86.010 PERSONAL HISTORY OF COLONIC POLYPS: ICD-10-CM

## 2024-01-03 DIAGNOSIS — R19.4 CHANGE IN BOWEL HABIT: ICD-10-CM

## 2024-01-03 PROCEDURE — 99215 OFFICE O/P EST HI 40 MIN: CPT | Mod: 25

## 2024-01-03 PROCEDURE — 36415 COLL VENOUS BLD VENIPUNCTURE: CPT

## 2024-01-03 NOTE — ASSESSMENT
[FreeTextEntry1] : Patient with moderate erosive gastritis in the antrum with biopsy showing intestinal metaplasia in the antrum.  She also has a 2 cm hiatal hernia.  She has been feeling progressively worse with postprandial lower abdominal cramping, early satiety, unintentional weight loss, and excessive belching.  She has been on pantoprazole 40 mg in the morning and famotidine 40 mg in the evening.  She also notes a change in bowel habits with softer/semisolid stools.  She has a history of adenomatous colonic polyps.  Multiple etiologies need to be considered.  Bloodwork was sent for CBC, chem-pack, TSH, celiac markers, CEA, CA 19-9.  Patient was sent for CT scan of the abdomen and pelvis.  Patient was sent for a gastric emptying scan.  Patient will continue pantoprazole 40 mg in the morning to be taken 30 to 60 minutes before breakfast.  I advised that she change famotidine to 40 mg at bedtime.  Patient will also take align 1 pill a day.  Will plan on repeating EGD in November 2026.  Patient is due for her next colonoscopy in April 2025.   Plan from 2/9/2023 - Patient with a history of a 2 cm hiatal hernia and reflux esophagitis who has been having atypical chest pain that appears to have responded to treatment with Protonix. She also has a history of adenomatous colonic polyps.    An EGD will be scheduled. The risks, benefits, alternatives, and limitations of the procedure were explained.    Patient is due for colonoscopy in April 2025.      Plan from 5/2/2022 - Patient with an adenomatous colonic polyp. She has reflux esophagitis and is doing well on Pepcid 20 mg a day.    We will repeat a colonoscopy in 3 years that is April 2025.      Plan from 12/7/2021 - Patient with a history of adenomatous colonic polyps and a strong family history of colon cancer in both her father and brother. Her previous colonoscopy had a fair to poor prep. She has a history of reflux esophagitis and a 2 cm hiatal hernia with her symptoms controlled by Pepcid 20 mg 1-2 times a day.    A colonoscopy has been scheduled. The risks, benefits, alternatives, and limitations of the procedure, including the possibility of missed lesions, were explained. The patient will use the Sutab prep, which hopefully will be easier for the patient to tolerate.    Patient will continue using Pepcid 20 mg 1-2 times a day.      Plan from 4/19/2021 - Patient with a 2 cm hiatal hernia with biopsy evidence of reflux esophagitis. She does get intermittent reflux symptoms particularly with spicy foods and sauces. Colonoscopy was fair to poorly prepped.    We discussed dietary and lifestyle modifications in the treatment of reflux and also discussed the importance of weight loss.    As the patient's symptoms are controlled with Pepcid 20 mg a day, the patient will continue this at the present time.    We will plan on repeating a colonoscopy at the end of this year in December 2021, utilizing the Suazo tab prep as she has had difficulty with the other preparations.    Patient will call me if her symptoms of reflux worsen. Otherwise she will return to see me at the end of the year.      Plan from 11/13/2020 - Patient with a history of adenomatous polyps and a strong family history of colon cancer in her father and brother. She has intermittent heartburn.    An EGD and colonoscopy have been scheduled. The risks, benefits, alternatives, and limitations of the procedures, including the possibility of missed lesions, were explained.

## 2024-01-03 NOTE — CONSULT LETTER
[FreeTextEntry1] : Dear Dr. Marcella Burns,\par  \par  I had the pleasure of seeing your patient SOFI MURDOCK in the office today.  My office note is attached. PLEASE READ THE "ASSESSMENT" SECTION OF THE NOTE TO SEE MY IMPRESSION AND PLAN.\par  \par  Thank you very much for allowing me to participate in the care of your patient.\par  \par  Sincerely,\par  \par  Maximus Schmidt M.D., FAC, FACP\par  Director, Celiac Program at [facility]\par   of Medicine, [employer] at [facility]  Adjunct  of Medicine, [facility]  Practice Director, [facility] - Gastroenterology at [place]\[facility]  [address] - [address]\[place] [address]\par  Tel: [phone]\par  Email: [email or web address]\par  \par  \par  The attached note has been created using a voice recognition system (Dragon).  There may be some misspellings and typos.  Please call my office if you have any issues or questions.

## 2024-01-03 NOTE — REASON FOR VISIT
[FreeTextEntry1] : Erosive gastritis, hiatal hernia, intestinal metaplasia in the antrum, abdominal cramping, belching, weight loss, early satiety, change in bowel habits, history of adenomatous colonic polyps

## 2024-01-03 NOTE — HISTORY OF PRESENT ILLNESS
[FreeTextEntry1] : We reviewed the patient's EGD performed on November 27, 2023.  The exam was significant for a 2 cm hiatal hernia with moderate erosive gastritis in the antrum and biopsies showing intestinal metaplasia in the antrum.  The patient has been on pantoprazole 40 mg in the morning and famotidine 40 mg in the morning.  She gets significant postprandial lower abdominal cramping lasting about an hour.  As a result, she is eating much less and has lost 12 pounds unintentionally.  She notes early satiety and excessive belching.  She also notices a change in bowel habits over the past year going from 1 solid bowel movement a day to 2 soft/semisolid bowel movements a day.  She denies melena or bright red blood per rectum.  She takes simethicone daily but states that her symptoms are progressively worsening.  The patient has a history of adenomatous colonic polyps.   Note from 2/9/2023 - The patient has a history of a 2 cm hiatal hernia with reflux esophagitis and a history of adenomatous colonic polyps.  She had been doing well on Pepcid 20 mg a day.  In November, the patient developed chest pain behind her left breast.  She went to the hospital where she had a negative evaluation and subsequently had a negative cardiac evaluation.  She then went to [facility] as she was diagnosed with breast cancer in May 2022 and underwent lumpectomy and radiation.  She had negative chest x-rays.  She then tried taking Protonix 20 mg twice daily along with the Pepcid 20 mg a day.  She took this for 3 weeks and then stopped it 1-1/2 weeks ago.  She noticed that the pain improved significantly on the Protonix and is now minimal.  She also notes that the pain was associated with belching.  There is no relationship to eating.  She denies heartburn, dysphagia, nausea, vomiting, abdominal pain.  She reports 1 normal bowel movement a day without melena or bright red blood per rectum.  Other than the hospitalization for lumpectomy, the patient has not been admitted to the hospital in the past year and denies any cardiac issues.   Note from 5/2/2022 - We reviewed the patient's colonoscopy performed on April 13, 2022.  2 polyps were removed, one tubular adenoma and 1 hyperplastic.  The patient also has internal and external hemorrhoids which are asymptomatic.  Patient has not history of reflux esophagitis and is doing well on Pepcid 20 mg a day.  She denies heartburn, dysphagia, abdominal pain.  She gets occasional gas relieved by simethicone.  Bowel movements are normal without melena or bright red blood per rectum.   Note from 12/7/2021 - The patient has a history of adenomatous colonic polyps as well as a family history of colon cancer in both her father and brother.  Her last colonoscopy was fair to poorly prepped performed 1 year ago.  She also has a history of reflux esophagitis and a 2 cm hiatal hernia.  She takes Pepcid 20 mg 1-2 times a day and denies heartburn or dysphagia.  Her gas symptoms have improved, using occasional simethicone.  She has 1 solid bowel movement a day without melena or bright red blood per rectum.  She has lost 6 pounds over the past year intentionally.  The patient has not been admitted to the hospital in the past year and denies any cardiac issues.   Note from 4/19/2021 - We reviewed the patient's EGD and colonoscopy performed on December 14, 2020.  EGD was significant for a 2 cm hiatal hernia with benign fundic gland polyps in the gastric body.  Biopsies showed evidence of reflux esophagitis.  Colonoscopy was fair to poorly prepped which limited evaluation.  The exam was grossly normal other than internal and external hemorrhoids which are asymptomatic.  The patient gets pain in the chest while eating with spicy foods and sauces.  She has been taking over-the-counter Pepcid once a day with relief.  She has also adjusted her diet.   Note from 11/13/2020 - The patient is a 65-year-old woman with a history of adenomatous colonic polyps and a strong family history of colon cancer in both her father and brother.  She also has reflux and has been having intermittent heartburn.  She was suffering from odynophagia but this seems to have resolved.  She denies abdominal pain.  She has 1 solid bowel movement a day without melena or bright red blood per rectum.  The patient's weight is stable.  The patient has rheumatoid arthritis and has been taking both indomethacin and Aleve.  She had COVID-19 in the spring with mild symptoms which have resolved.  The patient has not been admitted to the hospital in the past year and denies any cardiac issues.

## 2024-01-08 LAB
ALBUMIN SERPL ELPH-MCNC: 4.5 G/DL
ALP BLD-CCNC: 114 U/L
ALT SERPL-CCNC: 20 U/L
ANION GAP SERPL CALC-SCNC: 13 MMOL/L
AST SERPL-CCNC: 21 U/L
BASOPHILS # BLD AUTO: 0.03 K/UL
BASOPHILS NFR BLD AUTO: 0.5 %
BILIRUB SERPL-MCNC: 0.3 MG/DL
BUN SERPL-MCNC: 21 MG/DL
CALCIUM SERPL-MCNC: 9.2 MG/DL
CANCER AG19-9 SERPL-ACNC: <2 U/ML
CEA SERPL-MCNC: <0.6 NG/ML
CHLORIDE SERPL-SCNC: 104 MMOL/L
CO2 SERPL-SCNC: 26 MMOL/L
CREAT SERPL-MCNC: 0.91 MG/DL
EGFR: 69 ML/MIN/1.73M2
ENDOMYSIUM IGA SER QL: NEGATIVE
ENDOMYSIUM IGA TITR SER: NORMAL
EOSINOPHIL # BLD AUTO: 0.18 K/UL
EOSINOPHIL NFR BLD AUTO: 3.1 %
FOLATE SERPL-MCNC: >20 NG/ML
GGT SERPL-CCNC: 27 U/L
GLIADIN IGA SER QL: 5.2 UNITS
GLIADIN IGG SER QL: <5 UNITS
GLIADIN PEPTIDE IGA SER-ACNC: NEGATIVE
GLIADIN PEPTIDE IGG SER-ACNC: NEGATIVE
GLUCOSE SERPL-MCNC: 114 MG/DL
HCT VFR BLD CALC: 48 %
HGB BLD-MCNC: 13.9 G/DL
IGA SER QL IEP: 178 MG/DL
IMM GRANULOCYTES NFR BLD AUTO: 0.2 %
LYMPHOCYTES # BLD AUTO: 1.96 K/UL
LYMPHOCYTES NFR BLD AUTO: 33.7 %
MAN DIFF?: NORMAL
MCHC RBC-ENTMCNC: 29 GM/DL
MCHC RBC-ENTMCNC: 29.3 PG
MCV RBC AUTO: 101.3 FL
MONOCYTES # BLD AUTO: 0.54 K/UL
MONOCYTES NFR BLD AUTO: 9.3 %
NEUTROPHILS # BLD AUTO: 3.1 K/UL
NEUTROPHILS NFR BLD AUTO: 53.2 %
PLATELET # BLD AUTO: 230 K/UL
POTASSIUM SERPL-SCNC: 4.9 MMOL/L
PROT SERPL-MCNC: 7.1 G/DL
RBC # BLD: 4.74 M/UL
RBC # FLD: 13.6 %
SODIUM SERPL-SCNC: 143 MMOL/L
TSH SERPL-ACNC: 2.5 UIU/ML
TTG IGA SER IA-ACNC: <1.2 U/ML
TTG IGA SER-ACNC: NEGATIVE
VIT B12 SERPL-MCNC: 731 PG/ML
WBC # FLD AUTO: 5.82 K/UL

## 2024-01-25 ENCOUNTER — OUTPATIENT (OUTPATIENT)
Dept: OUTPATIENT SERVICES | Facility: HOSPITAL | Age: 69
LOS: 1 days | End: 2024-01-25
Payer: COMMERCIAL

## 2024-01-25 ENCOUNTER — APPOINTMENT (OUTPATIENT)
Dept: CT IMAGING | Facility: CLINIC | Age: 69
End: 2024-01-25
Payer: COMMERCIAL

## 2024-01-25 DIAGNOSIS — Z86.010 PERSONAL HISTORY OF COLONIC POLYPS: ICD-10-CM

## 2024-01-25 PROCEDURE — 74177 CT ABD & PELVIS W/CONTRAST: CPT | Mod: 26

## 2024-01-25 PROCEDURE — 74177 CT ABD & PELVIS W/CONTRAST: CPT

## 2024-01-31 ENCOUNTER — RX RENEWAL (OUTPATIENT)
Age: 69
End: 2024-01-31

## 2024-01-31 RX ORDER — ROSUVASTATIN CALCIUM 10 MG/1
10 TABLET, FILM COATED ORAL
Qty: 90 | Refills: 0 | Status: ACTIVE | COMMUNITY
Start: 2022-09-15 | End: 1900-01-01

## 2024-02-01 ENCOUNTER — APPOINTMENT (OUTPATIENT)
Dept: NUCLEAR MEDICINE | Facility: HOSPITAL | Age: 69
End: 2024-02-01
Payer: COMMERCIAL

## 2024-02-01 ENCOUNTER — OUTPATIENT (OUTPATIENT)
Dept: OUTPATIENT SERVICES | Facility: HOSPITAL | Age: 69
LOS: 1 days | End: 2024-02-01

## 2024-02-01 DIAGNOSIS — K44.9 DIAPHRAGMATIC HERNIA WITHOUT OBSTRUCTION OR GANGRENE: ICD-10-CM

## 2024-02-01 PROCEDURE — 78264 GASTRIC EMPTYING IMG STUDY: CPT | Mod: 26

## 2024-04-16 ENCOUNTER — TRANSCRIPTION ENCOUNTER (OUTPATIENT)
Age: 69
End: 2024-04-16

## 2024-04-19 ENCOUNTER — LABORATORY RESULT (OUTPATIENT)
Age: 69
End: 2024-04-19

## 2024-04-19 LAB
25(OH)D3 SERPL-MCNC: 35.8 NG/ML
ALBUMIN SERPL ELPH-MCNC: 4.4 G/DL
ALP BLD-CCNC: 125 U/L
ALT SERPL-CCNC: 18 U/L
ANION GAP SERPL CALC-SCNC: 12 MMOL/L
AST SERPL-CCNC: 23 U/L
BASOPHILS # BLD AUTO: 0.02 K/UL
BASOPHILS NFR BLD AUTO: 0.4 %
BILIRUB SERPL-MCNC: 0.5 MG/DL
BUN SERPL-MCNC: 21 MG/DL
CALCIUM SERPL-MCNC: 9.4 MG/DL
CHLORIDE SERPL-SCNC: 102 MMOL/L
CHOLEST SERPL-MCNC: 129 MG/DL
CK SERPL-CCNC: 90 U/L
CO2 SERPL-SCNC: 26 MMOL/L
CREAT SERPL-MCNC: 0.93 MG/DL
CRP SERPL HS-MCNC: 8.7 MG/L
EGFR: 67 ML/MIN/1.73M2
EOSINOPHIL # BLD AUTO: 0.21 K/UL
EOSINOPHIL NFR BLD AUTO: 4.4 %
ERYTHROCYTE [SEDIMENTATION RATE] IN BLOOD BY WESTERGREN METHOD: 26 MM/HR
GLUCOSE SERPL-MCNC: 83 MG/DL
HCT VFR BLD CALC: 43.2 %
HDLC SERPL-MCNC: 47 MG/DL
HGB BLD-MCNC: 13.6 G/DL
IMM GRANULOCYTES NFR BLD AUTO: 0 %
LDLC SERPL CALC-MCNC: 68 MG/DL
LDLC SERPL DIRECT ASSAY-MCNC: 71 MG/DL
LYMPHOCYTES # BLD AUTO: 1.4 K/UL
LYMPHOCYTES NFR BLD AUTO: 29.4 %
MAN DIFF?: NORMAL
MCHC RBC-ENTMCNC: 29.4 PG
MCHC RBC-ENTMCNC: 31.5 GM/DL
MCV RBC AUTO: 93.3 FL
MONOCYTES # BLD AUTO: 0.65 K/UL
MONOCYTES NFR BLD AUTO: 13.7 %
NEUTROPHILS # BLD AUTO: 2.48 K/UL
NEUTROPHILS NFR BLD AUTO: 52.1 %
NONHDLC SERPL-MCNC: 82 MG/DL
PLATELET # BLD AUTO: 206 K/UL
POTASSIUM SERPL-SCNC: 4.5 MMOL/L
PROT SERPL-MCNC: 6.9 G/DL
RBC # BLD: 4.63 M/UL
RBC # FLD: 12.9 %
RHEUMATOID FACT SER QL: 19 IU/ML
SODIUM SERPL-SCNC: 140 MMOL/L
T3RU NFR SERPL: 1.2 TBI
T4 FREE SERPL-MCNC: 1 NG/DL
TRIGL SERPL-MCNC: 66 MG/DL
TSH SERPL-ACNC: 2.08 UIU/ML
WBC # FLD AUTO: 4.76 K/UL

## 2024-04-20 LAB
ESTIMATED AVERAGE GLUCOSE: 120 MG/DL
HBA1C MFR BLD HPLC: 5.8 %

## 2024-04-23 LAB
APPEARANCE: CLEAR
BILIRUBIN URINE: NEGATIVE
BLOOD URINE: NEGATIVE
COLOR: YELLOW
GLUCOSE QUALITATIVE U: NEGATIVE MG/DL
KETONES URINE: NEGATIVE MG/DL
LEUKOCYTE ESTERASE URINE: NEGATIVE
NITRITE URINE: NEGATIVE
PH URINE: 5.5
PROTEIN URINE: NEGATIVE MG/DL
SPECIFIC GRAVITY URINE: 1.02
UROBILINOGEN URINE: 0.2 MG/DL

## 2024-05-03 ENCOUNTER — APPOINTMENT (OUTPATIENT)
Dept: INTERNAL MEDICINE | Facility: CLINIC | Age: 69
End: 2024-05-03
Payer: COMMERCIAL

## 2024-05-03 VITALS
WEIGHT: 218 LBS | HEIGHT: 64 IN | DIASTOLIC BLOOD PRESSURE: 83 MMHG | OXYGEN SATURATION: 97 % | BODY MASS INDEX: 37.22 KG/M2 | RESPIRATION RATE: 15 BRPM | TEMPERATURE: 98.2 F | SYSTOLIC BLOOD PRESSURE: 131 MMHG | HEART RATE: 65 BPM

## 2024-05-03 VITALS — SYSTOLIC BLOOD PRESSURE: 126 MMHG | DIASTOLIC BLOOD PRESSURE: 78 MMHG

## 2024-05-03 DIAGNOSIS — C50.912 MALIGNANT NEOPLASM OF UNSPECIFIED SITE OF LEFT FEMALE BREAST: ICD-10-CM

## 2024-05-03 DIAGNOSIS — I82.409 ACUTE EMBOLISM AND THROMBOSIS OF UNSPECIFIED DEEP VEINS OF UNSPECIFIED LOWER EXTREMITY: ICD-10-CM

## 2024-05-03 DIAGNOSIS — R73.02 IMPAIRED GLUCOSE TOLERANCE (ORAL): ICD-10-CM

## 2024-05-03 DIAGNOSIS — Z01.818 ENCOUNTER FOR OTHER PREPROCEDURAL EXAMINATION: ICD-10-CM

## 2024-05-03 DIAGNOSIS — M35.00 SICCA SYNDROME, UNSPECIFIED: ICD-10-CM

## 2024-05-03 DIAGNOSIS — E66.9 OBESITY, UNSPECIFIED: ICD-10-CM

## 2024-05-03 DIAGNOSIS — Z00.00 ENCOUNTER FOR GENERAL ADULT MEDICAL EXAMINATION W/OUT ABNORMAL FINDINGS: ICD-10-CM

## 2024-05-03 DIAGNOSIS — M06.9 RHEUMATOID ARTHRITIS, UNSPECIFIED: ICD-10-CM

## 2024-05-03 DIAGNOSIS — E78.2 MIXED HYPERLIPIDEMIA: ICD-10-CM

## 2024-05-03 DIAGNOSIS — K21.00 GASTRO-ESOPHAGEAL REFLUX DISEASE WITH ESOPHAGITIS, WITHOUT BLEEDING: ICD-10-CM

## 2024-05-03 DIAGNOSIS — R07.89 OTHER CHEST PAIN: ICD-10-CM

## 2024-05-03 DIAGNOSIS — E55.9 VITAMIN D DEFICIENCY, UNSPECIFIED: ICD-10-CM

## 2024-05-03 DIAGNOSIS — Z11.52 ENCOUNTER FOR SCREENING FOR COVID-19: ICD-10-CM

## 2024-05-03 PROCEDURE — 99397 PER PM REEVAL EST PAT 65+ YR: CPT

## 2024-05-03 RX ORDER — RIVAROXABAN 20 MG/1
20 TABLET, FILM COATED ORAL
Qty: 90 | Refills: 1 | Status: ACTIVE | COMMUNITY
Start: 2024-05-03

## 2024-05-03 RX ORDER — GLUCOSAMINE/MSM/CHONDROIT SULF 500-166.6
20 TABLET ORAL
Refills: 0 | Status: ACTIVE | COMMUNITY
Start: 2024-05-03

## 2024-05-03 NOTE — HISTORY OF PRESENT ILLNESS
[FreeTextEntry1] : Pt presented for PE.  Last PE was in 1/2022. [de-identified] : Her health was uneventful since last visit, she has no new complaint.   Pt had COVID infection in spring of 2020, she had mild disease.  She had COVID Vaccine and the booster and tolerated them well.   She had Flu vaccine.  She also had Shingle vaccine.  She would like to have pneumonia vaccine today.  Pt's major complaint now is pain on her foot, R>L, pain is across the top of her foot at the metatarsal area.  She has seem multiple doctors, and change her shoes, she has tried losing some weight.  There is no surgical option available but there was discussion about fusion of the bone, but not sure if that will help with pain.  Pain is preventing her from walking and exercise.  She is doing a lot of swimming.  She is taking Tramadol and NSAID for it.  Pt also diagnosed with DVT in the past year and is taking Xarelto.  Oncologist feels that DVT was due to taking Anastrozole and so pt will staay on Xarelto until she no longer needs to take hormonal therapy.

## 2024-05-03 NOTE — HEALTH RISK ASSESSMENT
[Very Good] : ~his/her~ current health as very good [Yes] : Yes [Monthly or less (1 pt)] : Monthly or less (1 point) [1 or 2 (0 pts)] : 1 or 2 (0 points) [Never (0 pts)] : Never (0 points) [No falls in past year] : Patient reported no falls in the past year [0] : 2) Feeling down, depressed, or hopeless: Not at all (0) [PHQ-2 Negative - No further assessment needed] : PHQ-2 Negative - No further assessment needed [No Retinopathy] : No retinopathy [None] : None [With Family] : lives with family [# of Members in Household ___] :  household currently consist of [unfilled] member(s) [Employed] : employed [Graduate School] : graduate school [] :  [# Of Children ___] : has [unfilled] children [Feels Safe at Home] : Feels safe at home [Fully functional (bathing, dressing, toileting, transferring, walking, feeding)] : Fully functional (bathing, dressing, toileting, transferring, walking, feeding) [Fully functional (using the telephone, shopping, preparing meals, housekeeping, doing laundry, using] : Fully functional and needs no help or supervision to perform IADLs (using the telephone, shopping, preparing meals, housekeeping, doing laundry, using transportation, managing medications and managing finances) [Smoke Detector] : smoke detector [Carbon Monoxide Detector] : carbon monoxide detector [Seat Belt] :  uses seat belt [With Patient/Caregiver] : , with patient/caregiver [Relationship: ___] : Relationship: [unfilled] [Excellent] : ~his/her~  mood as  excellent [Never] : Never [No] : No [Little interest or pleasure doing things] : 1) Little interest or pleasure doing things [Feeling down, depressed, or hopeless] : 2) Feeling down, depressed, or hopeless [Audit-CScore] : 0 [de-identified] : swims for 40 minutes for 7 days per week, otherwise somewhat active, [[id number]] : 0 [EyeExamDate] : 03/24 [Change in mental status noted] : No change in mental status noted [Reports changes in hearing] : Reports no changes in hearing [Reports changes in vision] : Reports no changes in vision [Reports changes in dental health] : Reports no changes in dental health [MammogramDate] : 03/24 [MammogramComments] : US breast -3/2024 [PapSmearDate] : 03/24 [BoneDensityDate] : 03/22 [ColonoscopyDate] : 04/22 [ColonoscopyComments] : EGD - 11/2023 [de-identified] : dentist -3/2024 [AdvancecareDate] : 01/22

## 2024-05-03 NOTE — REVIEW OF SYSTEMS
[Recent Change In Weight] : ~T recent weight change [Heartburn] : heartburn [Joint Pain] : joint pain [Joint Stiffness] : joint stiffness [Joint Swelling] : joint swelling [Negative] : Heme/Lymph [Hot Flashes] : hot flashes [Fever] : no fever [Chills] : no chills [Fatigue] : no fatigue [Chest Pain] : no chest pain [Palpitations] : no palpitations [Lower Ext Edema] : no lower extremity edema [Shortness Of Breath] : no shortness of breath [Wheezing] : no wheezing [Cough] : no cough [Dyspnea on Exertion] : no dyspnea on exertion [Abdominal Pain] : no abdominal pain [Nausea] : no nausea [Constipation] : no constipation [Diarrhea] : diarrhea [Vomiting] : no vomiting [Melena] : no melena [Dysuria] : no dysuria [Incontinence] : no incontinence [Nocturia] : no nocturia [Hematuria] : no hematuria [Back Pain] : no back pain [Itching] : no itching [Mole Changes] : no mole changes [Skin Rash] : no skin rash [Headache] : no headache [Dizziness] : no dizziness [Fainting] : no fainting [Unsteady Walking] : no ataxia [Insomnia] : no insomnia [Anxiety] : no anxiety [Depression] : no depression [Easy Bleeding] : no easy bleeding [Easy Bruising] : no easy bruising [Swollen Glands] : no swollen glands [FreeTextEntry2] : Lost about 12 lbs over the past 6 months, [FreeTextEntry3] : Wears reading glasses, [FreeTextEntry7] : No heartburn on Pepcid & Pantoprazole. [FreeTextEntry8] : Nocturia of 1 X per night,  [FreeTextEntry9] : No change of her symptoms,

## 2024-05-03 NOTE — PHYSICAL EXAM
[No Acute Distress] : no acute distress [Well Nourished] : well nourished [Well Developed] : well developed [Normal Sclera/Conjunctiva] : normal sclera/conjunctiva [PERRL] : pupils equal round and reactive to light [EOMI] : extraocular movements intact [Normal Outer Ear/Nose] : the outer ears and nose were normal in appearance [Normal Oropharynx] : the oropharynx was normal [Normal TMs] : both tympanic membranes were normal [Normal Nasal Mucosa] : the nasal mucosa was normal [No JVD] : no jugular venous distention [No Lymphadenopathy] : no lymphadenopathy [Supple] : supple [No Respiratory Distress] : no respiratory distress  [Clear to Auscultation] : lungs were clear to auscultation bilaterally [Normal Rate] : normal rate  [Regular Rhythm] : with a regular rhythm [Normal S1, S2] : normal S1 and S2 [No Carotid Bruits] : no carotid bruits [Pedal Pulses Present] : the pedal pulses are present [No Edema] : there was no peripheral edema [No Extremity Clubbing/Cyanosis] : no extremity clubbing/cyanosis [Normal Appearance] : normal in appearance [No Masses] : no palpable masses [No Nipple Discharge] : no nipple discharge [No Axillary Lymphadenopathy] : no axillary lymphadenopathy [Soft] : abdomen soft [Non Tender] : non-tender [Non-distended] : non-distended [Normal Bowel Sounds] : normal bowel sounds [Declined Rectal Exam] : declined rectal exam [Normal Supraclavicular Nodes] : no supraclavicular lymphadenopathy [Normal Axillary Nodes] : no axillary lymphadenopathy [Normal Posterior Cervical Nodes] : no posterior cervical lymphadenopathy [Normal Anterior Cervical Nodes] : no anterior cervical lymphadenopathy [No CVA Tenderness] : no CVA  tenderness [No Spinal Tenderness] : no spinal tenderness [No Joint Swelling] : no joint swelling [Grossly Normal Strength/Tone] : grossly normal strength/tone [No Rash] : no rash [Coordination Grossly Intact] : coordination grossly intact [No Focal Deficits] : no focal deficits [Normal Gait] : normal gait [Speech Grossly Normal] : speech grossly normal [Normal Affect] : the affect was normal [Alert and Oriented x3] : oriented to person, place, and time [Normal Mood] : the mood was normal [de-identified] : Obese female in stated age,  [FreeTextEntry1] : deferred, pt had it done with GYN recently,

## 2024-05-16 ENCOUNTER — APPOINTMENT (OUTPATIENT)
Dept: ORTHOPEDIC SURGERY | Facility: CLINIC | Age: 69
End: 2024-05-16
Payer: COMMERCIAL

## 2024-05-16 VITALS
DIASTOLIC BLOOD PRESSURE: 83 MMHG | HEART RATE: 69 BPM | HEIGHT: 64 IN | WEIGHT: 218 LBS | SYSTOLIC BLOOD PRESSURE: 137 MMHG | BODY MASS INDEX: 37.22 KG/M2

## 2024-05-16 DIAGNOSIS — M19.071 PRIMARY OSTEOARTHRITIS, RIGHT ANKLE AND FOOT: ICD-10-CM

## 2024-05-16 DIAGNOSIS — M19.079 PRIMARY OSTEOARTHRITIS, UNSPECIFIED ANKLE AND FOOT: ICD-10-CM

## 2024-05-16 PROCEDURE — 99203 OFFICE O/P NEW LOW 30 MIN: CPT | Mod: 25

## 2024-05-16 PROCEDURE — 20604 DRAIN/INJ JOINT/BURSA W/US: CPT | Mod: RT

## 2024-05-17 PROBLEM — M19.071 ARTHRITIS OF RIGHT FOOT: Status: ACTIVE | Noted: 2024-05-17

## 2024-05-17 PROBLEM — M19.079 ARTHRITIS OF FOOT: Noted: 2023-09-17

## 2024-05-17 NOTE — HISTORY OF PRESENT ILLNESS
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 She has been treated by Dr. Doherty, was recommended reconstructive surgery but has opted to proceed with conservative treatment at this time. She was in [place] last week doing a significant amount of walking and feels her pain flared up. She takes Tramadol 25mg BID prescribed by her rheumatologist for pain. She is interested in proceeding with steroid injection for the right midfoot.

## 2024-05-17 NOTE — PHYSICAL EXAM
[de-identified] : Constitutional: Well-nourished, well-developed, No acute distress Respiratory:  Good respiratory effort, no SOB Lymphatic: No regional lymphadenopathy, no lymphedema Psychiatric: Pleasant and normal affect, alert and oriented x3 Skin: Clean dry and intact right foot Musculoskeletal: normal except where as noted in regional exam     Right Foot: APPEARANCE: + Claw toes, no swelling, no marked deformities or malalignment POSITIVE TENDERNESS: 2nd/3rd Tarsometatarsal joints, dorsal foot NONTENDER: 5th metatarsal base, cuboid, 1st MTP, plantar surfaces, medial heel, mid heel. ROM: normal throughout foot, ankle, and digits. RESISTIVE TESTING: painless flex/ext, abd/add of all digits.

## 2024-05-17 NOTE — DISCUSSION/SUMMARY
[de-identified] : Discussed findings of today's exam and possible causes of patient's pain.  Educated patient on their most probable diagnosis of chronic intermittent foot pain with recent atraumatic exacerbation due to Right tarsometatarsal joint osteoarthritis.  Patient elected to proceed with a diagnostic/therapeutic ultrasound guided steroid injection today (see procedure note).  Patient advised to make note of whether their pain is improved starting in the next few minutes until 4-6 hours while the lidocaine numbs the interior of the TMT joint; this is the diagnostic part of the injection. If pain is relieved immediately that helps us determine that the etiology of the pain is coming from within the TMT joint. The steroid injected into the joint today will start to have an effect in the coming days, will be most effective in the next 1-2 weeks, and may last 1-2 months; that is the therapeutic portion of this injection.  The patient should follow up with Dr. Doherty, in 1-2 months for further management.  Patient was accompanied to the office today by her daughter who is an orthopedic resident specializing in foot and ankle.  Patient appreciates and agrees with current plan.   This note was generated using dragon medical dictation software.  A reasonable effort has been made for proofreading its contents, but typos may still remain.  If there are any questions or points of clarification needed please notify my office.

## 2024-05-17 NOTE — PHYSICAL EXAM
[de-identified] : Constitutional: Well-nourished, well-developed, No acute distress Respiratory:  Good respiratory effort, no SOB Lymphatic: No regional lymphadenopathy, no lymphedema Psychiatric: Pleasant and normal affect, alert and oriented x3 Skin: Clean dry and intact right foot Musculoskeletal: normal except where as noted in regional exam     Right Foot: APPEARANCE: + Claw toes, no swelling, no marked deformities or malalignment POSITIVE TENDERNESS: 2nd/3rd Tarsometatarsal joints, dorsal foot NONTENDER: 5th metatarsal base, cuboid, 1st MTP, plantar surfaces, medial heel, mid heel. ROM: normal throughout foot, ankle, and digits. RESISTIVE TESTING: painless flex/ext, abd/add of all digits.

## 2024-05-17 NOTE — DISCUSSION/SUMMARY
[de-identified] : Discussed findings of today's exam and possible causes of patient's pain.  Educated patient on their most probable diagnosis of chronic intermittent foot pain with recent atraumatic exacerbation due to Right tarsometatarsal joint osteoarthritis.  Patient elected to proceed with a diagnostic/therapeutic ultrasound guided steroid injection today (see procedure note).  Patient advised to make note of whether their pain is improved starting in the next few minutes until 4-6 hours while the lidocaine numbs the interior of the TMT joint; this is the diagnostic part of the injection. If pain is relieved immediately that helps us determine that the etiology of the pain is coming from within the TMT joint. The steroid injected into the joint today will start to have an effect in the coming days, will be most effective in the next 1-2 weeks, and may last 1-2 months; that is the therapeutic portion of this injection.  The patient should follow up with Dr. Doherty, in 1-2 months for further management.  Patient was accompanied to the office today by her daughter who is an orthopedic resident specializing in foot and ankle.  Patient appreciates and agrees with current plan.   This note was generated using dragon medical dictation software.  A reasonable effort has been made for proofreading its contents, but typos may still remain.  If there are any questions or points of clarification needed please notify my office.

## 2024-05-17 NOTE — PROCEDURE
[de-identified] : Procedure:  Ultrasound Guided cortisone injection of the Right 2nd/3rd tarsometatarsal joint Indication for ultrasound guidance: Ensure placement within the joint, and avoidance of superficial neurovasculature and musculotendinous structures Indication for injection: Osteoarthritis  Utlizing the [employer] portable ultrasound machine, the Linear 25mm 10-5 MHz transducer, sterile probe cover and sterile ultrasound gel, ultrasound guidance with the probe in the longitudinal axis, utilizing an out of plane approach was used for the injection.  A discussion was had with the patient regarding this procedure and all questions were answered. All risks, benefits and alternatives were discussed. These included but were not limited to bleeding, infection, and allergic reaction. A timeout was done to ensure correct side and patient agreed to the procedure.  A [place] aramis was created on the skin utilizing a plastic needle cap to aramis the anticipated point of entry. Alcohol was used to clean the skin, and Betadine was used to sterilize and prep the area in the dorsum of the foot overlying the 2nd/3rd tarsometatarsal joint. Ethyl chloride spray was then used as a topical anesthetic. A 25-gauge needle was used to inject 1cc of 0.25% bupivacaine and 1cc of 40mg/ml methylprednisolone into the TMT  joint. A sterile bandage was then applied. The patient tolerated the procedure well.

## 2024-05-17 NOTE — PROCEDURE
[de-identified] : Procedure:  Ultrasound Guided cortisone injection of the Right 2nd/3rd tarsometatarsal joint Indication for ultrasound guidance: Ensure placement within the joint, and avoidance of superficial neurovasculature and musculotendinous structures Indication for injection: Osteoarthritis  Utlizing the [employer] portable ultrasound machine, the Linear 25mm 10-5 MHz transducer, sterile probe cover and sterile ultrasound gel, ultrasound guidance with the probe in the longitudinal axis, utilizing an out of plane approach was used for the injection.  A discussion was had with the patient regarding this procedure and all questions were answered. All risks, benefits and alternatives were discussed. These included but were not limited to bleeding, infection, and allergic reaction. A timeout was done to ensure correct side and patient agreed to the procedure.  A [place] raamis was created on the skin utilizing a plastic needle cap to aramis the anticipated point of entry. Alcohol was used to clean the skin, and Betadine was used to sterilize and prep the area in the dorsum of the foot overlying the 2nd/3rd tarsometatarsal joint. Ethyl chloride spray was then used as a topical anesthetic. A 25-gauge needle was used to inject 1cc of 0.25% bupivacaine and 1cc of 40mg/ml methylprednisolone into the TMT  joint. A sterile bandage was then applied. The patient tolerated the procedure well.

## 2024-05-17 NOTE — HISTORY OF PRESENT ILLNESS
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 She has been treated by Dr. Doherty, was recommended reconstructive surgery but has opted to proceed with conservative treatment at this time. She was in [place] last week doing a significant amount of walking and feels her pain flared up. She takes Tramadol 25mg BID prescribed by her rheumatologist for pain. She is interested in proceeding with steroid injection for the right midfoot.

## 2024-09-13 ENCOUNTER — RX RENEWAL (OUTPATIENT)
Age: 69
End: 2024-09-13

## 2025-02-03 ENCOUNTER — TRANSCRIPTION ENCOUNTER (OUTPATIENT)
Age: 70
End: 2025-02-03

## 2025-02-04 ENCOUNTER — TRANSCRIPTION ENCOUNTER (OUTPATIENT)
Age: 70
End: 2025-02-04

## 2025-02-05 ENCOUNTER — LABORATORY RESULT (OUTPATIENT)
Age: 70
End: 2025-02-05

## 2025-02-05 ENCOUNTER — APPOINTMENT (OUTPATIENT)
Dept: GASTROENTEROLOGY | Facility: CLINIC | Age: 70
End: 2025-02-05
Payer: COMMERCIAL

## 2025-02-05 VITALS
TEMPERATURE: 96.8 F | SYSTOLIC BLOOD PRESSURE: 130 MMHG | HEART RATE: 64 BPM | WEIGHT: 213 LBS | BODY MASS INDEX: 36.37 KG/M2 | DIASTOLIC BLOOD PRESSURE: 80 MMHG | OXYGEN SATURATION: 97 % | HEIGHT: 64 IN

## 2025-02-05 DIAGNOSIS — Z86.0101 PERSONAL HISTORY OF ADENOMATOUS AND SERRATED COLON POLYPS: ICD-10-CM

## 2025-02-05 DIAGNOSIS — R19.5 OTHER FECAL ABNORMALITIES: ICD-10-CM

## 2025-02-05 DIAGNOSIS — K44.9 DIAPHRAGMATIC HERNIA W/OUT OBSTRUCTION OR GANGRENE: ICD-10-CM

## 2025-02-05 DIAGNOSIS — K31.A0 GASTRIC INTESTINAL METAPLASIA, UNSPECIFIED: ICD-10-CM

## 2025-02-05 DIAGNOSIS — K29.60 OTHER GASTRITIS W/OUT BLEEDING: ICD-10-CM

## 2025-02-05 PROCEDURE — 36415 COLL VENOUS BLD VENIPUNCTURE: CPT

## 2025-02-05 PROCEDURE — 99214 OFFICE O/P EST MOD 30 MIN: CPT

## 2025-02-05 RX ORDER — SODIUM PICOSULFATE, MAGNESIUM OXIDE, AND ANHYDROUS CITRIC ACID 12; 3.5; 1 G/175ML; G/175ML; MG/175ML
10-3.5-12 MG-GM LIQUID ORAL
Qty: 2 | Refills: 0 | Status: ACTIVE | COMMUNITY
Start: 2025-02-05 | End: 1900-01-01

## 2025-02-12 ENCOUNTER — TRANSCRIPTION ENCOUNTER (OUTPATIENT)
Age: 70
End: 2025-02-12

## 2025-02-13 ENCOUNTER — TRANSCRIPTION ENCOUNTER (OUTPATIENT)
Age: 70
End: 2025-02-13

## 2025-02-13 LAB
25(OH)D3 SERPL-MCNC: 58.4 NG/ML
ALBUMIN SERPL ELPH-MCNC: 4.7 G/DL
ALP BLD-CCNC: 136 U/L
ALT SERPL-CCNC: 16 U/L
ANION GAP SERPL CALC-SCNC: 10 MMOL/L
AST SERPL-CCNC: 20 U/L
BASOPHILS # BLD AUTO: 0.04 K/UL
BASOPHILS NFR BLD AUTO: 0.7 %
BILIRUB SERPL-MCNC: 0.4 MG/DL
BUN SERPL-MCNC: 21 MG/DL
CALCIUM SERPL-MCNC: 9.8 MG/DL
CANCER AG19-9 SERPL-ACNC: <2 U/ML
CHLORIDE SERPL-SCNC: 101 MMOL/L
CO2 SERPL-SCNC: 29 MMOL/L
CREAT SERPL-MCNC: 0.94 MG/DL
EGFR: 66 ML/MIN/1.73M2
ENDOMYSIUM IGA SER QL: NEGATIVE
ENDOMYSIUM IGA TITR SER: NORMAL
EOSINOPHIL # BLD AUTO: 0.24 K/UL
EOSINOPHIL NFR BLD AUTO: 3.9 %
GGT SERPL-CCNC: 30 U/L
GLIADIN IGA SER QL: <0.2 U/ML
GLIADIN IGG SER QL: <0.4 U/ML
GLIADIN PEPTIDE IGA SER-ACNC: NEGATIVE
GLIADIN PEPTIDE IGG SER-ACNC: NEGATIVE
GLUCOSE SERPL-MCNC: 84 MG/DL
HCT VFR BLD CALC: 46.9 %
HGB BLD-MCNC: 14.4 G/DL
IGA SER QL IEP: 187 MG/DL
IMM GRANULOCYTES NFR BLD AUTO: 0.2 %
LYMPHOCYTES # BLD AUTO: 2.02 K/UL
LYMPHOCYTES NFR BLD AUTO: 32.8 %
MAN DIFF?: NORMAL
MCHC RBC-ENTMCNC: 29.7 PG
MCHC RBC-ENTMCNC: 30.7 G/DL
MCV RBC AUTO: 96.7 FL
MONOCYTES # BLD AUTO: 0.5 K/UL
MONOCYTES NFR BLD AUTO: 8.1 %
NEUTROPHILS # BLD AUTO: 3.34 K/UL
NEUTROPHILS NFR BLD AUTO: 54.3 %
PLATELET # BLD AUTO: 229 K/UL
POTASSIUM SERPL-SCNC: 4.9 MMOL/L
PROT SERPL-MCNC: 7.6 G/DL
RBC # BLD: 4.85 M/UL
RBC # FLD: 13.5 %
SODIUM SERPL-SCNC: 140 MMOL/L
TSH SERPL-ACNC: 2.16 UIU/ML
TTG IGA SER IA-ACNC: <0.5 U/ML
TTG IGA SER-ACNC: NEGATIVE
WBC # FLD AUTO: 6.15 K/UL

## 2025-02-17 ENCOUNTER — APPOINTMENT (OUTPATIENT)
Dept: INTERNAL MEDICINE | Facility: CLINIC | Age: 70
End: 2025-02-17
Payer: COMMERCIAL

## 2025-02-17 VITALS — DIASTOLIC BLOOD PRESSURE: 78 MMHG | SYSTOLIC BLOOD PRESSURE: 122 MMHG

## 2025-02-17 VITALS
WEIGHT: 213 LBS | OXYGEN SATURATION: 98 % | HEIGHT: 64 IN | TEMPERATURE: 98.4 F | HEART RATE: 60 BPM | DIASTOLIC BLOOD PRESSURE: 83 MMHG | BODY MASS INDEX: 36.37 KG/M2 | SYSTOLIC BLOOD PRESSURE: 134 MMHG

## 2025-02-17 DIAGNOSIS — I82.409 ACUTE EMBOLISM AND THROMBOSIS OF UNSPECIFIED DEEP VEINS OF UNSPECIFIED LOWER EXTREMITY: ICD-10-CM

## 2025-02-17 DIAGNOSIS — M06.9 RHEUMATOID ARTHRITIS, UNSPECIFIED: ICD-10-CM

## 2025-02-17 DIAGNOSIS — R74.8 ABNORMAL LEVELS OF OTHER SERUM ENZYMES: ICD-10-CM

## 2025-02-17 DIAGNOSIS — G89.29 OTHER CHRONIC PAIN: ICD-10-CM

## 2025-02-17 PROCEDURE — G2211 COMPLEX E/M VISIT ADD ON: CPT | Mod: NC

## 2025-02-17 PROCEDURE — 99214 OFFICE O/P EST MOD 30 MIN: CPT

## 2025-02-17 RX ORDER — TRAMADOL HYDROCHLORIDE 50 MG/1
50 TABLET, COATED ORAL
Qty: 60 | Refills: 2 | Status: ACTIVE | COMMUNITY
Start: 2025-02-17 | End: 1900-01-01

## 2025-02-17 RX ORDER — CLOTRIMAZOLE AND BETAMETHASONE DIPROPIONATE 10; .5 MG/G; MG/G
1-0.05 CREAM TOPICAL
Qty: 15 | Refills: 0 | Status: ACTIVE | COMMUNITY
Start: 2024-09-26

## 2025-02-17 RX ORDER — AMOXICILLIN 875 MG/1
875 TABLET, FILM COATED ORAL
Qty: 60 | Refills: 0 | Status: COMPLETED | COMMUNITY
Start: 2024-10-09 | End: 2025-02-17

## 2025-02-17 RX ORDER — METRONIDAZOLE 500 MG/1
500 TABLET ORAL
Qty: 14 | Refills: 0 | Status: COMPLETED | COMMUNITY
Start: 2024-09-26 | End: 2025-02-17

## 2025-02-28 ENCOUNTER — APPOINTMENT (OUTPATIENT)
Dept: ORTHOPEDIC SURGERY | Facility: CLINIC | Age: 70
End: 2025-02-28
Payer: COMMERCIAL

## 2025-02-28 VITALS — WEIGHT: 213 LBS | HEIGHT: 64 IN | BODY MASS INDEX: 36.37 KG/M2

## 2025-02-28 DIAGNOSIS — M17.11 UNILATERAL PRIMARY OSTEOARTHRITIS, RIGHT KNEE: ICD-10-CM

## 2025-02-28 PROCEDURE — G2211 COMPLEX E/M VISIT ADD ON: CPT | Mod: NC

## 2025-02-28 PROCEDURE — 73564 X-RAY EXAM KNEE 4 OR MORE: CPT | Mod: RT

## 2025-02-28 PROCEDURE — 99214 OFFICE O/P EST MOD 30 MIN: CPT

## 2025-04-03 ENCOUNTER — TRANSCRIPTION ENCOUNTER (OUTPATIENT)
Age: 70
End: 2025-04-03

## 2025-04-09 ENCOUNTER — TRANSCRIPTION ENCOUNTER (OUTPATIENT)
Age: 70
End: 2025-04-09

## 2025-04-23 ENCOUNTER — APPOINTMENT (OUTPATIENT)
Dept: GASTROENTEROLOGY | Facility: AMBULATORY MEDICAL SERVICES | Age: 70
End: 2025-04-23
Payer: COMMERCIAL

## 2025-04-23 PROCEDURE — 45380 COLONOSCOPY AND BIOPSY: CPT | Mod: 33

## 2025-04-29 ENCOUNTER — TRANSCRIPTION ENCOUNTER (OUTPATIENT)
Age: 70
End: 2025-04-29

## 2025-04-30 ENCOUNTER — TRANSCRIPTION ENCOUNTER (OUTPATIENT)
Age: 70
End: 2025-04-30

## 2025-05-05 ENCOUNTER — TRANSCRIPTION ENCOUNTER (OUTPATIENT)
Age: 70
End: 2025-05-05

## 2025-06-09 ENCOUNTER — TRANSCRIPTION ENCOUNTER (OUTPATIENT)
Age: 70
End: 2025-06-09

## 2025-06-12 ENCOUNTER — APPOINTMENT (OUTPATIENT)
Dept: INTERNAL MEDICINE | Facility: CLINIC | Age: 70
End: 2025-06-12
Payer: COMMERCIAL

## 2025-06-12 VITALS
HEART RATE: 67 BPM | BODY MASS INDEX: 36.37 KG/M2 | RESPIRATION RATE: 15 BRPM | HEIGHT: 64 IN | TEMPERATURE: 98.7 F | SYSTOLIC BLOOD PRESSURE: 133 MMHG | OXYGEN SATURATION: 98 % | WEIGHT: 213 LBS | DIASTOLIC BLOOD PRESSURE: 79 MMHG

## 2025-06-12 VITALS — SYSTOLIC BLOOD PRESSURE: 126 MMHG | DIASTOLIC BLOOD PRESSURE: 74 MMHG

## 2025-06-12 PROCEDURE — 99213 OFFICE O/P EST LOW 20 MIN: CPT

## 2025-06-12 PROCEDURE — G2211 COMPLEX E/M VISIT ADD ON: CPT | Mod: NC

## 2025-07-07 ENCOUNTER — TRANSCRIPTION ENCOUNTER (OUTPATIENT)
Age: 70
End: 2025-07-07

## 2025-07-16 ENCOUNTER — APPOINTMENT (OUTPATIENT)
Dept: GASTROENTEROLOGY | Facility: CLINIC | Age: 70
End: 2025-07-16

## 2025-07-29 ENCOUNTER — APPOINTMENT (OUTPATIENT)
Dept: ORTHOPEDIC SURGERY | Facility: CLINIC | Age: 70
End: 2025-07-29

## 2025-07-29 VITALS
BODY MASS INDEX: 36.37 KG/M2 | SYSTOLIC BLOOD PRESSURE: 125 MMHG | RESPIRATION RATE: 16 BRPM | WEIGHT: 213 LBS | DIASTOLIC BLOOD PRESSURE: 78 MMHG | OXYGEN SATURATION: 96 % | HEART RATE: 59 BPM | HEIGHT: 64 IN

## 2025-07-29 PROCEDURE — 99203 OFFICE O/P NEW LOW 30 MIN: CPT | Mod: 25

## 2025-07-29 PROCEDURE — 20604 DRAIN/INJ JOINT/BURSA W/US: CPT | Mod: LT,T3

## 2025-08-01 ENCOUNTER — APPOINTMENT (OUTPATIENT)
Dept: INTERNAL MEDICINE | Facility: CLINIC | Age: 70
End: 2025-08-01